# Patient Record
Sex: FEMALE | Race: WHITE | NOT HISPANIC OR LATINO | Employment: UNEMPLOYED | URBAN - METROPOLITAN AREA
[De-identification: names, ages, dates, MRNs, and addresses within clinical notes are randomized per-mention and may not be internally consistent; named-entity substitution may affect disease eponyms.]

---

## 2020-02-12 PROBLEM — I47.19 AV NODAL RE-ENTRY TACHYCARDIA: Status: ACTIVE | Noted: 2020-02-12

## 2020-02-12 PROBLEM — G90.A POTS (POSTURAL ORTHOSTATIC TACHYCARDIA SYNDROME): Status: ACTIVE | Noted: 2020-02-12

## 2020-02-12 PROBLEM — E89.40 POSTSURGICAL MENOPAUSE: Status: ACTIVE | Noted: 2020-02-12

## 2020-02-12 PROBLEM — I47.1 AV NODAL RE-ENTRY TACHYCARDIA: Status: ACTIVE | Noted: 2020-02-12

## 2021-03-23 ENCOUNTER — BULK ORDERING (OUTPATIENT)
Dept: CASE MANAGEMENT | Facility: OTHER | Age: 61
End: 2021-03-23

## 2021-03-23 DIAGNOSIS — Z23 IMMUNIZATION DUE: ICD-10-CM

## 2021-03-25 ENCOUNTER — TRANSCRIBE ORDERS (OUTPATIENT)
Dept: ADMINISTRATIVE | Facility: HOSPITAL | Age: 61
End: 2021-03-25

## 2021-03-25 ENCOUNTER — TRANSCRIBE ORDERS (OUTPATIENT)
Dept: SLEEP MEDICINE | Facility: HOSPITAL | Age: 61
End: 2021-03-25

## 2021-03-25 DIAGNOSIS — M54.41 RIGHT-SIDED LOW BACK PAIN WITH RIGHT-SIDED SCIATICA, UNSPECIFIED CHRONICITY: Primary | ICD-10-CM

## 2021-04-08 ENCOUNTER — HOSPITAL ENCOUNTER (OUTPATIENT)
Dept: MRI IMAGING | Facility: HOSPITAL | Age: 61
Discharge: HOME | End: 2021-04-08
Admitting: ORTHOPAEDIC SURGERY

## 2021-04-08 DIAGNOSIS — M54.41 RIGHT-SIDED LOW BACK PAIN WITH RIGHT-SIDED SCIATICA, UNSPECIFIED CHRONICITY: ICD-10-CM

## 2021-04-08 PROCEDURE — 72148 MRI LUMBAR SPINE W/O DYE: CPT

## 2021-05-14 ENCOUNTER — TRANSCRIBE ORDERS (OUTPATIENT)
Dept: ADMINISTRATIVE | Facility: HOSPITAL | Age: 61
End: 2021-05-14

## 2021-05-14 DIAGNOSIS — M25.551 RIGHT HIP PAIN: Primary | ICD-10-CM

## 2021-06-17 ENCOUNTER — APPOINTMENT (OUTPATIENT)
Dept: MRI IMAGING | Facility: HOSPITAL | Age: 61
End: 2021-06-17

## 2021-06-21 ENCOUNTER — HOSPITAL ENCOUNTER (OUTPATIENT)
Dept: GENERAL RADIOLOGY | Facility: HOSPITAL | Age: 61
Discharge: HOME | End: 2021-06-21

## 2021-06-21 ENCOUNTER — HOSPITAL ENCOUNTER (OUTPATIENT)
Dept: MRI IMAGING | Facility: HOSPITAL | Age: 61
Discharge: HOME | End: 2021-06-21

## 2021-06-21 DIAGNOSIS — M25.551 RIGHT HIP PAIN: ICD-10-CM

## 2021-06-21 PROCEDURE — 77002 NEEDLE LOCALIZATION BY XRAY: CPT

## 2021-06-21 PROCEDURE — 25010000002 IOPAMIDOL 61 % SOLUTION: Performed by: ORTHOPAEDIC SURGERY

## 2021-06-21 PROCEDURE — A9577 INJ MULTIHANCE: HCPCS | Performed by: ORTHOPAEDIC SURGERY

## 2021-06-21 PROCEDURE — 73722 MRI JOINT OF LWR EXTR W/DYE: CPT

## 2021-06-21 PROCEDURE — 25010000003 LIDOCAINE 1 % SOLUTION: Performed by: ORTHOPAEDIC SURGERY

## 2021-06-21 PROCEDURE — 0 GADOBENATE DIMEGLUMINE 529 MG/ML SOLUTION: Performed by: ORTHOPAEDIC SURGERY

## 2021-06-21 RX ORDER — LIDOCAINE HYDROCHLORIDE 10 MG/ML
10 INJECTION, SOLUTION INFILTRATION; PERINEURAL ONCE
Status: COMPLETED | OUTPATIENT
Start: 2021-06-21 | End: 2021-06-21

## 2021-06-21 RX ADMIN — GADOBENATE DIMEGLUMINE 1 ML: 529 INJECTION, SOLUTION INTRAVENOUS at 14:23

## 2021-06-21 RX ADMIN — LIDOCAINE HYDROCHLORIDE 10 ML: 10 INJECTION, SOLUTION INFILTRATION; PERINEURAL at 14:09

## 2021-06-21 RX ADMIN — IOPAMIDOL 10 ML: 612 INJECTION, SOLUTION INTRAVENOUS at 14:09

## 2021-06-23 ENCOUNTER — TRANSCRIBE ORDERS (OUTPATIENT)
Dept: ADMINISTRATIVE | Facility: HOSPITAL | Age: 61
End: 2021-06-23

## 2021-06-23 DIAGNOSIS — M81.0 OSTEOPOROSIS, POSTMENOPAUSAL: Primary | ICD-10-CM

## 2021-06-29 ENCOUNTER — ANESTHESIA EVENT (OUTPATIENT)
Dept: PERIOP | Facility: HOSPITAL | Age: 61
End: 2021-06-29

## 2021-06-29 ENCOUNTER — APPOINTMENT (OUTPATIENT)
Dept: BONE DENSITY | Facility: HOSPITAL | Age: 61
End: 2021-06-29

## 2021-06-29 ENCOUNTER — PRE-ADMISSION TESTING (OUTPATIENT)
Dept: PREADMISSION TESTING | Facility: HOSPITAL | Age: 61
End: 2021-06-29

## 2021-06-29 DIAGNOSIS — M81.0 OSTEOPOROSIS, POSTMENOPAUSAL: ICD-10-CM

## 2021-06-29 LAB
ANION GAP SERPL CALCULATED.3IONS-SCNC: 12.8 MMOL/L (ref 5–15)
BACTERIA UR QL AUTO: ABNORMAL /HPF
BILIRUB UR QL STRIP: NEGATIVE
BUN SERPL-MCNC: 12 MG/DL (ref 6–20)
BUN/CREAT SERPL: 14.5 (ref 7–25)
CALCIUM SPEC-SCNC: 10.2 MG/DL (ref 8.6–10.5)
CHLORIDE SERPL-SCNC: 107 MMOL/L (ref 98–107)
CLARITY UR: CLEAR
CO2 SERPL-SCNC: 19.2 MMOL/L (ref 22–29)
COLOR UR: YELLOW
CREAT SERPL-MCNC: 0.83 MG/DL (ref 0.57–1)
DEPRECATED RDW RBC AUTO: 48.5 FL (ref 37–54)
ERYTHROCYTE [DISTWIDTH] IN BLOOD BY AUTOMATED COUNT: 13.5 % (ref 12.3–15.4)
GFR SERPL CREATININE-BSD FRML MDRD: 70 ML/MIN/1.73
GLUCOSE SERPL-MCNC: 80 MG/DL (ref 65–99)
GLUCOSE UR STRIP-MCNC: NEGATIVE MG/DL
HCT VFR BLD AUTO: 46.1 % (ref 34–46.6)
HGB BLD-MCNC: 15.1 G/DL (ref 12–15.9)
HGB UR QL STRIP.AUTO: NEGATIVE
HYALINE CASTS UR QL AUTO: ABNORMAL /LPF
KETONES UR QL STRIP: NEGATIVE
LEUKOCYTE ESTERASE UR QL STRIP.AUTO: ABNORMAL
MCH RBC QN AUTO: 31.8 PG (ref 26.6–33)
MCHC RBC AUTO-ENTMCNC: 32.8 G/DL (ref 31.5–35.7)
MCV RBC AUTO: 97.1 FL (ref 79–97)
NITRITE UR QL STRIP: NEGATIVE
PH UR STRIP.AUTO: 6 [PH] (ref 5–8)
PLATELET # BLD AUTO: 265 10*3/MM3 (ref 140–450)
PMV BLD AUTO: 11.2 FL (ref 6–12)
POTASSIUM SERPL-SCNC: 4.2 MMOL/L (ref 3.5–5.2)
PROT UR QL STRIP: NEGATIVE
RBC # BLD AUTO: 4.75 10*6/MM3 (ref 3.77–5.28)
RBC # UR: ABNORMAL /HPF
REF LAB TEST METHOD: ABNORMAL
SODIUM SERPL-SCNC: 139 MMOL/L (ref 136–145)
SP GR UR STRIP: 1.02 (ref 1–1.03)
SQUAMOUS #/AREA URNS HPF: ABNORMAL /HPF
UROBILINOGEN UR QL STRIP: ABNORMAL
WBC # BLD AUTO: 9.91 10*3/MM3 (ref 3.4–10.8)
WBC UR QL AUTO: ABNORMAL /HPF

## 2021-06-29 PROCEDURE — 80048 BASIC METABOLIC PNL TOTAL CA: CPT

## 2021-06-29 PROCEDURE — 93005 ELECTROCARDIOGRAM TRACING: CPT

## 2021-06-29 PROCEDURE — 77080 DXA BONE DENSITY AXIAL: CPT

## 2021-06-29 PROCEDURE — C9803 HOPD COVID-19 SPEC COLLECT: HCPCS

## 2021-06-29 PROCEDURE — 87186 SC STD MICRODIL/AGAR DIL: CPT

## 2021-06-29 PROCEDURE — 85027 COMPLETE CBC AUTOMATED: CPT

## 2021-06-29 PROCEDURE — U0004 COV-19 TEST NON-CDC HGH THRU: HCPCS | Performed by: ORTHOPAEDIC SURGERY

## 2021-06-29 PROCEDURE — 87077 CULTURE AEROBIC IDENTIFY: CPT

## 2021-06-29 PROCEDURE — 87086 URINE CULTURE/COLONY COUNT: CPT

## 2021-06-29 PROCEDURE — 36415 COLL VENOUS BLD VENIPUNCTURE: CPT

## 2021-06-29 PROCEDURE — 81001 URINALYSIS AUTO W/SCOPE: CPT

## 2021-06-29 RX ORDER — HYDROCODONE BITARTRATE AND ACETAMINOPHEN 5; 325 MG/1; MG/1
1 TABLET ORAL EVERY 6 HOURS PRN
COMMUNITY
End: 2021-07-01 | Stop reason: HOSPADM

## 2021-06-29 RX ORDER — ERGOCALCIFEROL 1.25 MG/1
5000 CAPSULE ORAL DAILY
COMMUNITY

## 2021-06-30 LAB
QT INTERVAL: 388 MS
QTC INTERVAL: 442 MS
SARS-COV-2 RNA NOSE QL NAA+PROBE: NOT DETECTED

## 2021-07-01 ENCOUNTER — APPOINTMENT (OUTPATIENT)
Dept: GENERAL RADIOLOGY | Facility: HOSPITAL | Age: 61
End: 2021-07-01

## 2021-07-01 ENCOUNTER — ANESTHESIA (OUTPATIENT)
Dept: PERIOP | Facility: HOSPITAL | Age: 61
End: 2021-07-01

## 2021-07-01 ENCOUNTER — HOSPITAL ENCOUNTER (OUTPATIENT)
Facility: HOSPITAL | Age: 61
Setting detail: HOSPITAL OUTPATIENT SURGERY
Discharge: HOME | End: 2021-07-01
Attending: ORTHOPAEDIC SURGERY | Admitting: ORTHOPAEDIC SURGERY

## 2021-07-01 VITALS
WEIGHT: 216 LBS | OXYGEN SATURATION: 92 % | HEIGHT: 67 IN | SYSTOLIC BLOOD PRESSURE: 126 MMHG | HEART RATE: 82 BPM | BODY MASS INDEX: 33.9 KG/M2 | TEMPERATURE: 97.8 F | RESPIRATION RATE: 14 BRPM | DIASTOLIC BLOOD PRESSURE: 63 MMHG

## 2021-07-01 DIAGNOSIS — M84.353A FEMORAL NECK STRESS FRACTURE, INITIAL ENCOUNTER: Primary | ICD-10-CM

## 2021-07-01 LAB — BACTERIA SPEC AEROBE CULT: ABNORMAL

## 2021-07-01 PROCEDURE — 25010000002 DEXAMETHASONE PER 1 MG: Performed by: NURSE ANESTHETIST, CERTIFIED REGISTERED

## 2021-07-01 PROCEDURE — 25010000002 MIDAZOLAM PER 1MG: Performed by: NURSE ANESTHETIST, CERTIFIED REGISTERED

## 2021-07-01 PROCEDURE — 25010000002 LORAZEPAM PER 2 MG: Performed by: NURSE ANESTHETIST, CERTIFIED REGISTERED

## 2021-07-01 PROCEDURE — C1713 ANCHOR/SCREW BN/BN,TIS/BN: HCPCS | Performed by: ORTHOPAEDIC SURGERY

## 2021-07-01 PROCEDURE — 25010000002 ONDANSETRON PER 1 MG: Performed by: NURSE ANESTHETIST, CERTIFIED REGISTERED

## 2021-07-01 PROCEDURE — C1769 GUIDE WIRE: HCPCS | Performed by: ORTHOPAEDIC SURGERY

## 2021-07-01 PROCEDURE — 76000 FLUOROSCOPY <1 HR PHYS/QHP: CPT

## 2021-07-01 PROCEDURE — 25010000002 HYDROMORPHONE 1 MG/ML SOLUTION: Performed by: NURSE ANESTHETIST, CERTIFIED REGISTERED

## 2021-07-01 PROCEDURE — 94799 UNLISTED PULMONARY SVC/PX: CPT

## 2021-07-01 PROCEDURE — 25010000003 CEFAZOLIN SODIUM-DEXTROSE 2-3 GM-%(50ML) RECONSTITUTED SOLUTION: Performed by: ORTHOPAEDIC SURGERY

## 2021-07-01 PROCEDURE — 25010000002 HYDROMORPHONE PER 4 MG: Performed by: NURSE ANESTHETIST, CERTIFIED REGISTERED

## 2021-07-01 PROCEDURE — 25010000002 PROPOFOL 200 MG/20ML EMULSION: Performed by: NURSE ANESTHETIST, CERTIFIED REGISTERED

## 2021-07-01 PROCEDURE — 25010000002 FENTANYL CITRATE (PF) 100 MCG/2ML SOLUTION: Performed by: NURSE ANESTHETIST, CERTIFIED REGISTERED

## 2021-07-01 DEVICE — IMPLANTABLE DEVICE: Type: IMPLANTABLE DEVICE | Site: HIP | Status: FUNCTIONAL

## 2021-07-01 RX ORDER — DEXAMETHASONE SODIUM PHOSPHATE 4 MG/ML
INJECTION, SOLUTION INTRA-ARTICULAR; INTRALESIONAL; INTRAMUSCULAR; INTRAVENOUS; SOFT TISSUE AS NEEDED
Status: DISCONTINUED | OUTPATIENT
Start: 2021-07-01 | End: 2021-07-01 | Stop reason: SURG

## 2021-07-01 RX ORDER — ONDANSETRON 2 MG/ML
4 INJECTION INTRAMUSCULAR; INTRAVENOUS ONCE
Status: DISCONTINUED | OUTPATIENT
Start: 2021-07-01 | End: 2021-07-01 | Stop reason: HOSPADM

## 2021-07-01 RX ORDER — SODIUM CHLORIDE 0.9 % (FLUSH) 0.9 %
10 SYRINGE (ML) INJECTION AS NEEDED
Status: DISCONTINUED | OUTPATIENT
Start: 2021-07-01 | End: 2021-07-01 | Stop reason: HOSPADM

## 2021-07-01 RX ORDER — OXYCODONE HYDROCHLORIDE AND ACETAMINOPHEN 5; 325 MG/1; MG/1
1 TABLET ORAL EVERY 4 HOURS PRN
Qty: 60 TABLET | Refills: 0 | Status: SHIPPED | OUTPATIENT
Start: 2021-07-01

## 2021-07-01 RX ORDER — HYDROCODONE BITARTRATE AND ACETAMINOPHEN 5; 325 MG/1; MG/1
1 TABLET ORAL EVERY 6 HOURS PRN
Status: DISCONTINUED | OUTPATIENT
Start: 2021-07-01 | End: 2021-07-01 | Stop reason: HOSPADM

## 2021-07-01 RX ORDER — MIDAZOLAM HYDROCHLORIDE 2 MG/2ML
INJECTION, SOLUTION INTRAMUSCULAR; INTRAVENOUS AS NEEDED
Status: DISCONTINUED | OUTPATIENT
Start: 2021-07-01 | End: 2021-07-01 | Stop reason: SURG

## 2021-07-01 RX ORDER — SODIUM CHLORIDE, SODIUM LACTATE, POTASSIUM CHLORIDE, CALCIUM CHLORIDE 600; 310; 30; 20 MG/100ML; MG/100ML; MG/100ML; MG/100ML
1000 INJECTION, SOLUTION INTRAVENOUS CONTINUOUS
Status: DISCONTINUED | OUTPATIENT
Start: 2021-07-01 | End: 2021-07-01 | Stop reason: HOSPADM

## 2021-07-01 RX ORDER — HYDROMORPHONE HCL 110MG/55ML
PATIENT CONTROLLED ANALGESIA SYRINGE INTRAVENOUS AS NEEDED
Status: DISCONTINUED | OUTPATIENT
Start: 2021-07-01 | End: 2021-07-01 | Stop reason: SURG

## 2021-07-01 RX ORDER — CEFAZOLIN SODIUM 2 G/50ML
2 SOLUTION INTRAVENOUS ONCE
Status: COMPLETED | OUTPATIENT
Start: 2021-07-01 | End: 2021-07-01

## 2021-07-01 RX ORDER — BUPIVACAINE HYDROCHLORIDE 5 MG/ML
INJECTION, SOLUTION EPIDURAL; INTRACAUDAL AS NEEDED
Status: DISCONTINUED | OUTPATIENT
Start: 2021-07-01 | End: 2021-07-01 | Stop reason: HOSPADM

## 2021-07-01 RX ORDER — IPRATROPIUM BROMIDE AND ALBUTEROL SULFATE 2.5; .5 MG/3ML; MG/3ML
3 SOLUTION RESPIRATORY (INHALATION) ONCE
Status: DISCONTINUED | OUTPATIENT
Start: 2021-07-01 | End: 2021-07-01 | Stop reason: HOSPADM

## 2021-07-01 RX ORDER — PROPOFOL 10 MG/ML
INJECTION, EMULSION INTRAVENOUS AS NEEDED
Status: DISCONTINUED | OUTPATIENT
Start: 2021-07-01 | End: 2021-07-01 | Stop reason: SURG

## 2021-07-01 RX ORDER — ONDANSETRON 2 MG/ML
INJECTION INTRAMUSCULAR; INTRAVENOUS AS NEEDED
Status: DISCONTINUED | OUTPATIENT
Start: 2021-07-01 | End: 2021-07-01 | Stop reason: SURG

## 2021-07-01 RX ORDER — FENTANYL CITRATE 50 UG/ML
INJECTION, SOLUTION INTRAMUSCULAR; INTRAVENOUS AS NEEDED
Status: DISCONTINUED | OUTPATIENT
Start: 2021-07-01 | End: 2021-07-01 | Stop reason: SURG

## 2021-07-01 RX ORDER — LIDOCAINE HYDROCHLORIDE 20 MG/ML
INJECTION, SOLUTION INTRAVENOUS AS NEEDED
Status: DISCONTINUED | OUTPATIENT
Start: 2021-07-01 | End: 2021-07-01 | Stop reason: SURG

## 2021-07-01 RX ORDER — LORAZEPAM 2 MG/ML
1 INJECTION INTRAMUSCULAR ONCE
Status: COMPLETED | OUTPATIENT
Start: 2021-07-01 | End: 2021-07-01

## 2021-07-01 RX ORDER — SODIUM CHLORIDE, SODIUM LACTATE, POTASSIUM CHLORIDE, CALCIUM CHLORIDE 600; 310; 30; 20 MG/100ML; MG/100ML; MG/100ML; MG/100ML
INJECTION, SOLUTION INTRAVENOUS CONTINUOUS PRN
Status: DISCONTINUED | OUTPATIENT
Start: 2021-07-01 | End: 2021-07-01 | Stop reason: SURG

## 2021-07-01 RX ORDER — ACETAMINOPHEN 500 MG
500 TABLET ORAL ONCE
Status: COMPLETED | OUTPATIENT
Start: 2021-07-01 | End: 2021-07-01

## 2021-07-01 RX ADMIN — LORAZEPAM 1 MG: 2 INJECTION INTRAMUSCULAR; INTRAVENOUS at 15:34

## 2021-07-01 RX ADMIN — HYDROMORPHONE HYDROCHLORIDE 0.5 MG: 1 INJECTION, SOLUTION INTRAMUSCULAR; INTRAVENOUS; SUBCUTANEOUS at 15:32

## 2021-07-01 RX ADMIN — LIDOCAINE HYDROCHLORIDE 40 MG: 20 INJECTION, SOLUTION INTRAVENOUS at 14:21

## 2021-07-01 RX ADMIN — HYDROMORPHONE HYDROCHLORIDE 0.5 MG: 2 INJECTION, SOLUTION INTRAMUSCULAR; INTRAVENOUS; SUBCUTANEOUS at 14:54

## 2021-07-01 RX ADMIN — HYDROCODONE BITARTRATE AND ACETAMINOPHEN 1 TABLET: 5; 325 TABLET ORAL at 13:50

## 2021-07-01 RX ADMIN — SODIUM CHLORIDE, POTASSIUM CHLORIDE, SODIUM LACTATE AND CALCIUM CHLORIDE: 600; 310; 30; 20 INJECTION, SOLUTION INTRAVENOUS at 14:06

## 2021-07-01 RX ADMIN — SODIUM CHLORIDE, POTASSIUM CHLORIDE, SODIUM LACTATE AND CALCIUM CHLORIDE 1000 ML: 600; 310; 30; 20 INJECTION, SOLUTION INTRAVENOUS at 13:13

## 2021-07-01 RX ADMIN — SODIUM CHLORIDE, POTASSIUM CHLORIDE, SODIUM LACTATE AND CALCIUM CHLORIDE: 600; 310; 30; 20 INJECTION, SOLUTION INTRAVENOUS at 14:20

## 2021-07-01 RX ADMIN — CEFAZOLIN SODIUM 2 G: 2 SOLUTION INTRAVENOUS at 14:15

## 2021-07-01 RX ADMIN — ONDANSETRON 4 MG: 2 INJECTION INTRAMUSCULAR; INTRAVENOUS at 14:43

## 2021-07-01 RX ADMIN — ACETAMINOPHEN 500 MG: 500 TABLET ORAL at 13:50

## 2021-07-01 RX ADMIN — FENTANYL CITRATE 50 MCG: 50 INJECTION INTRAMUSCULAR; INTRAVENOUS at 14:20

## 2021-07-01 RX ADMIN — PROPOFOL 150 MG: 10 INJECTION, EMULSION INTRAVENOUS at 14:22

## 2021-07-01 RX ADMIN — HYDROMORPHONE HYDROCHLORIDE 0.5 MG: 1 INJECTION, SOLUTION INTRAMUSCULAR; INTRAVENOUS; SUBCUTANEOUS at 15:56

## 2021-07-01 RX ADMIN — DEXAMETHASONE SODIUM PHOSPHATE 8 MG: 4 INJECTION, SOLUTION INTRAMUSCULAR; INTRAVENOUS at 14:43

## 2021-07-01 RX ADMIN — MIDAZOLAM HYDROCHLORIDE 2 MG: 1 INJECTION, SOLUTION INTRAMUSCULAR; INTRAVENOUS at 14:16

## 2021-07-01 RX ADMIN — FENTANYL CITRATE 50 MCG: 50 INJECTION INTRAMUSCULAR; INTRAVENOUS at 14:17

## 2021-07-01 RX ADMIN — HYDROMORPHONE HYDROCHLORIDE 0.5 MG: 2 INJECTION, SOLUTION INTRAMUSCULAR; INTRAVENOUS; SUBCUTANEOUS at 14:51

## 2021-07-01 NOTE — BRIEF OP NOTE
HIP CANNULATED SCREW PLACEMENT  Progress Note    Zulay Vega  7/1/2021    Pre-op Diagnosis:   Pain in right hip [M25.551]       Post-Op Diagnosis Codes:     * Pain in right hip [M25.551]    Procedure/CPT® Codes:        Procedure(s):  CANNULATED SCREWS FIXATION OF RIGHT HIP FRACTURE    Surgeon(s):  Ady Gomez MD    Anesthesia: General    Staff:   Circulator: Marcie Esposito RN; Lisset Simon RN  Radiology Technologist: Laura Renee  Scrub Person: Chuy Sommers CSA; Karen Pierce         Estimated Blood Loss: minimal    Urine Voided: * No values recorded between 7/1/2021  2:16 PM and 7/1/2021  3:20 PM *    Specimens:                None          Drains: * No LDAs found *    Findings: see full dictation    Complications: none          Ady Gomez MD     Date: 7/1/2021  Time: 15:21 EDT

## 2021-07-01 NOTE — ANESTHESIA PREPROCEDURE EVALUATION
Anesthesia Evaluation     Patient summary reviewed and Nursing notes reviewed   no history of anesthetic complications:  NPO Solid Status: > 8 hours  NPO Liquid Status: > 8 hours           Airway   Mallampati: II  TM distance: >3 FB  Neck ROM: full  Possible difficult intubation and Large neck circumference  Dental - normal exam   (+) poor dentition    Pulmonary    (+) a smoker Current, COPD, shortness of breath, sleep apnea, decreased breath sounds,   Cardiovascular - normal exam    ECG reviewed    (+) past MI ( unknown, mi per ekg) , dysrhythmias Tachycardia, angina with exertion, MALLORY,   CAD:  inc risk.      Neuro/Psych- negative ROS  GI/Hepatic/Renal/Endo    (+) obesity,       Musculoskeletal     (+) arthralgias, back pain, chronic pain, myalgias,   Abdominal   (+) obese,     Bowel sounds: normal.   Substance History - negative use  Drug use:  ? use.     OB/GYN negative ob/gyn ROS         Other   arthritis,      ROS/Med Hx Other: Postural orthostati ctachycardia hypotension  Labs reviewed  ekg sr septal infarct                Anesthesia Plan    ASA 4     general   (Risks and benefits discussed including risk of aspiration, recall and dental damage. All patient questions answered.    Will continue with plan of care.Tylenol 500mg  hydrocodone 5mg)  intravenous induction     Anesthetic plan, all risks, benefits, and alternatives have been provided, discussed and informed consent has been obtained with: patient.    Plan discussed with attending.

## 2021-07-01 NOTE — DISCHARGE INSTRUCTIONS
USE WALKER TO AMBULATE, TOE TOUCH WEIGHT BEARING ON RIGHT SIDE  YOU MAY REMOVE DRESSING ON POD #2.  CHANGE DRESSING DAILY UNTIL NO DRAINAGE FOR TWO CONSECUTIVE DAYS.  YOU MAY SHOWER BEGINNING ON POD #3.  KEEP WOUND CLEAN AND DRY.  NO SOAKING/SCRUBBING/SWIMMING  NO OINTMENTS OR LOTIONS    Rest today  No pushing,pulling,tugging,heavy lifting, or strenuous activity   No major decision making,driving,or drinking alcoholic beverages for 24 hours due to the sedation you received  Always use good hand hygiene/washing technique  No driving on pain medication.    To assist you in voiding:  Drink plenty of fluids  Listen to running water while attempting to void.    If you are unable to urinate and you have an uncomfortable urge to void or it has been   6 hours since you were discharged, return to the Emergency Room

## 2021-07-01 NOTE — ANESTHESIA PROCEDURE NOTES
Airway  Urgency: elective    Date/Time: 7/1/2021 2:16 PM    General Information and Staff    Patient location during procedure: OR  CRNA: Scott Carrasco CRNA    Indications and Patient Condition  Indications: management.    Preoxygenated: yes  Mask difficulty assessment: 1 - vent by mask    Final Airway Details  Final airway type: supraglottic airway      Successful airway: classic  Size 4    Number of attempts at approach: 1  Assessment: lips, teeth, and gum same as pre-op and atraumatic intubation    Additional Comments  Lma placed by standard fashion, cuff up with mov, bbs and expansion =, + etco, tolerated without adverse rx. Syringe to  balloon for cuff pressure equalization, gauge in the green zone

## 2021-07-01 NOTE — ANESTHESIA POSTPROCEDURE EVALUATION
Patient: Zulay Vega    Procedure Summary     Date: 07/01/21 Room / Location: [facility] OR 1 / [facility] OR    Anesthesia Start: 1415 Anesthesia Stop:     Procedure: CANNULATED SCREWS FIXATION OF RIGHT HIP FRACTURE (Right Hip) Diagnosis:       Pain in right hip      (Pain in right hip [M25.551])    Surgeons: Ady Gomez MD Provider: Juan Diaz CRNA    Anesthesia Type: general ASA Status: 4          Anesthesia Type: general    Vitals  No vitals data found for the desired time range.          Post Anesthesia Care and Evaluation    Patient location during evaluation: PACU  Patient participation: complete - patient participated  Level of consciousness: awake  Pain score: 0  Pain management: adequate  Airway patency: patent  Anesthetic complications: No anesthetic complications  PONV Status: none  Cardiovascular status: acceptable  Respiratory status: acceptable and face mask  Hydration status: acceptable    Comments: vsss resp spont, reflexes intact, responsive, report given to pacu nurse

## 2021-07-01 NOTE — OP NOTE
Preoperative diagnosis: R femoral neck stress fracture  Postoperative diagnosis: Same  Procedure: Closed reduction and fixation with cannulated screws of the R hip  Surgeon: Jason Gaines.: None  Anesthesia: LMA  Estimated blood loss: minimal  Implants: 7.3mm cannulated screws x3    Description of procedure:    The patient was identified in the preoperative holding area at [facility] and transported to operating room the care of myself and anesthesia staff.  The patient was placed supine on her bed where anesthesia was given.  The patient was then placed on a fracture table with slight traction placed on both lower extremities.  The operative side was confirmed with a timeout as well as the appropriate patient.  The operative antibodies were delivered.  We confirmed the fracture by fluoroscopy.  We then turned our attention towards incision.    A percutaneous incision was made for all 3 cannulated screws.  A threaded guidepin was placed through the percutaneous incision, and advanced across the femoral neck and into the femoral head.  This process was repeated for all 3 cannulated screws.  Chose an inverted triangle configuration of our cannulated screws.  With the threaded guide pins in the appropriate position in both AP and lateral views we then measured the length of the guide pins and then used the cannulated drill guide to perforate the lateral cortex.  We then placed the cannulated screws over the guide pins into the appropriate position confirmed the position on fluoroscopy and then removed the associated guide pins from the inner portion of the cannulated screws.    We then irrigated the wounds and closed them with staples.  The patient tolerated procedure well.  All counts correct ×2.

## 2022-03-29 ENCOUNTER — HOSPITAL ENCOUNTER (OUTPATIENT)
Dept: GENERAL RADIOLOGY | Facility: HOSPITAL | Age: 62
Discharge: HOME | End: 2022-03-29
Admitting: ANESTHESIOLOGY

## 2022-03-29 ENCOUNTER — TRANSCRIBE ORDERS (OUTPATIENT)
Dept: GENERAL RADIOLOGY | Facility: HOSPITAL | Age: 62
End: 2022-03-29

## 2022-03-29 DIAGNOSIS — M25.562 LEFT KNEE PAIN, UNSPECIFIED CHRONICITY: ICD-10-CM

## 2022-03-29 DIAGNOSIS — M25.561 RIGHT KNEE PAIN, UNSPECIFIED CHRONICITY: ICD-10-CM

## 2022-03-29 DIAGNOSIS — M25.561 RIGHT KNEE PAIN, UNSPECIFIED CHRONICITY: Primary | ICD-10-CM

## 2022-03-29 PROCEDURE — 73562 X-RAY EXAM OF KNEE 3: CPT

## 2023-05-04 ENCOUNTER — HOSPITAL ENCOUNTER (OUTPATIENT)
Dept: ULTRASOUND IMAGING | Facility: HOSPITAL | Age: 63
Discharge: HOME | End: 2023-05-04
Admitting: NURSE PRACTITIONER
Payer: MEDICAID

## 2023-05-04 ENCOUNTER — TRANSCRIBE ORDERS (OUTPATIENT)
Dept: ULTRASOUND IMAGING | Facility: HOSPITAL | Age: 63
End: 2023-05-04
Payer: MEDICAID

## 2023-05-04 DIAGNOSIS — R10.11 ABDOMINAL PAIN, RIGHT UPPER QUADRANT: Primary | ICD-10-CM

## 2023-05-04 DIAGNOSIS — R10.11 ABDOMINAL PAIN, RIGHT UPPER QUADRANT: ICD-10-CM

## 2023-05-04 PROCEDURE — 76705 ECHO EXAM OF ABDOMEN: CPT

## 2023-05-18 ENCOUNTER — OFFICE VISIT (OUTPATIENT)
Dept: GASTROENTEROLOGY | Facility: CLINIC | Age: 63
End: 2023-05-18
Payer: MEDICAID

## 2023-05-18 VITALS
BODY MASS INDEX: 39.24 KG/M2 | HEIGHT: 67 IN | DIASTOLIC BLOOD PRESSURE: 66 MMHG | TEMPERATURE: 97.8 F | WEIGHT: 250 LBS | SYSTOLIC BLOOD PRESSURE: 121 MMHG | HEART RATE: 79 BPM

## 2023-05-18 DIAGNOSIS — Z12.11 ENCOUNTER FOR SCREENING FOR MALIGNANT NEOPLASM OF COLON: ICD-10-CM

## 2023-05-18 DIAGNOSIS — R10.30 LOWER ABDOMINAL PAIN: Primary | ICD-10-CM

## 2023-05-18 DIAGNOSIS — E66.09 CLASS 2 OBESITY DUE TO EXCESS CALORIES WITHOUT SERIOUS COMORBIDITY WITH BODY MASS INDEX (BMI) OF 39.0 TO 39.9 IN ADULT: ICD-10-CM

## 2023-05-18 DIAGNOSIS — K58.0 IRRITABLE BOWEL SYNDROME WITH DIARRHEA: ICD-10-CM

## 2023-05-18 DIAGNOSIS — R19.4 CHANGE IN BOWEL HABITS: ICD-10-CM

## 2023-05-18 DIAGNOSIS — K21.9 GASTROESOPHAGEAL REFLUX DISEASE WITHOUT ESOPHAGITIS: ICD-10-CM

## 2023-05-18 DIAGNOSIS — K76.0 NON-ALCOHOLIC FATTY LIVER DISEASE: ICD-10-CM

## 2023-05-18 DIAGNOSIS — R10.11 RIGHT UPPER QUADRANT ABDOMINAL PAIN: ICD-10-CM

## 2023-05-18 RX ORDER — METAXALONE 800 MG/1
800 TABLET ORAL 3 TIMES DAILY
COMMUNITY

## 2023-05-18 RX ORDER — PREGABALIN 150 MG/1
150 CAPSULE ORAL 3 TIMES DAILY
COMMUNITY

## 2023-05-18 RX ORDER — DICYCLOMINE HCL 20 MG
20 TABLET ORAL 3 TIMES DAILY PRN
Qty: 60 TABLET | Refills: 2 | Status: SHIPPED | OUTPATIENT
Start: 2023-05-18

## 2023-05-18 RX ORDER — SODIUM CHLORIDE 9 MG/ML
70 INJECTION, SOLUTION INTRAVENOUS CONTINUOUS PRN
OUTPATIENT
Start: 2023-05-18

## 2023-05-18 RX ORDER — CYANOCOBALAMIN 1000 UG/ML
1000 INJECTION, SOLUTION INTRAMUSCULAR; SUBCUTANEOUS
COMMUNITY

## 2023-05-18 RX ORDER — BISACODYL 5 MG/1
20 TABLET, DELAYED RELEASE ORAL ONCE
Qty: 4 TABLET | Refills: 0 | Status: SHIPPED | OUTPATIENT
Start: 2023-05-18 | End: 2023-05-18

## 2023-05-18 RX ORDER — ESTRADIOL 0.07 MG/D
1 FILM, EXTENDED RELEASE TRANSDERMAL 2 TIMES WEEKLY
COMMUNITY
Start: 2023-03-15

## 2023-05-18 RX ORDER — ATORVASTATIN CALCIUM 80 MG/1
1 TABLET, FILM COATED ORAL DAILY
COMMUNITY
Start: 2023-05-13

## 2023-05-18 RX ORDER — PANTOPRAZOLE SODIUM 40 MG/1
40 TABLET, DELAYED RELEASE ORAL DAILY
Qty: 30 TABLET | Refills: 11 | COMMUNITY
Start: 2022-12-15 | End: 2023-12-15

## 2023-05-18 NOTE — PROGRESS NOTES
New Patient Consult      Date: 2023   Patient Name: Zulay Vega  MRN: [id number]  : 1960     Referring Physician: Jackelyn Ramey APRN    Chief Complaint   Patient presents with   • + H. pylori test       History of Present Illness: Zulay Vega is a 63 y.o. female who is here today to establish care with Gastroenterology for evaluation for ongoing abdominal pain change in bowel habit.    Patient states that she has been having issues with the significant abdominal bloating, abdominal distention, right-sided lower abdominal pain over a year now which is not improving.    Her pain is mostly on the right side abdomen and also sometimes suprapubic region sometimes pain radiates to her back.  Pain intermittent present most days.  She feels bloated mostly on the right side abdomen.  She has been having bowel movement 2 to 3/day which very different in shape and sometimes floats.  Denies any blood in the stool.  No constipation as such.  Occasional nausea associated but no vomiting.  She is a chronic smoker and her dietary habit is also not healthy and drinks multiple cans of carbonated beverages at least 4 to 5 20 once beverages /day.  As per patient she also gained over 100 pounds since last 1 to 2 years.    She also has a history of gastroesophageal reflux disease and is well controlled with PPI daily that has been started by PCP in 2023.  She states that she had a blood work done which was positive for H. pylori for which he was given a triple therapy.  To note that H. pylori breath test was negative before the antibiotic therapy.    No prior EGD or colonoscopy.  She is nonalcoholic.  No history of anemia.  No family history of colon cancer or GI malignancy.  Her lab work done in 2023 revealed a normal CBC with a hemoglobin of 14 and normal CMP.  Celiac serology was negative.  Subjective      Past Medical History:   Past Medical History:   Diagnosis Date   • Angina  pectoris 06/29/2021    at rest or exersion- was told by Dr. Ramey to learn to live with it/ none for 6 mon. to 1 yr   • Arthritis    • AV yesenia tachycardia     with svt   • Blurred vision    • Dizziness    • Heart disease    • Hypotension     orthostatic   • Migraines    • Mitral valve disease 06/29/2021   • Osteoporosis 06/29/2021   • Pain in joint involving multiple sites    • Poor historian 06/29/2021   • Snores        Past Surgical History:   Past Surgical History:   Procedure Laterality Date   • CARDIAC ABLATION      2001   • HIP CANNULATED SCREW PLACEMENT Right 07/01/2021    Procedure: CANNULATED SCREWS FIXATION OF RIGHT HIP FRACTURE;  Surgeon: Ady Gomez MD;  Location: [facility];  Service: Orthopedics;  Laterality: Right;   • TONSILLECTOMY Bilateral    • VAGINAL DELIVERY N/A    • VAGINAL HYSTERECTOMY SALPINGO OOPHORECTOMY Bilateral        Family History:   Family History   Problem Relation Age of Onset   • Pancreatic cancer Paternal Aunt    • Colon cancer Neg Hx    • Cirrhosis Neg Hx    • Liver cancer Neg Hx    • Liver disease Neg Hx    • Stomach cancer Neg Hx    • Esophageal cancer Neg Hx        Social History:   Social History     Socioeconomic History   • Marital status:    Tobacco Use   • Smoking status: Every Day     Packs/day: 0.50     Types: Cigarettes     Start date: 1976   • Smokeless tobacco: Never   Vaping Use   • Vaping Use: Former   • Substances: Nicotine, Flavoring   • Devices: Disposable   Substance and Sexual Activity   • Alcohol use: Never   • Drug use: Never   • Sexual activity: Defer         Current Outpatient Medications:   •  ACETAMINOPHEN PO, Take  by mouth., Disp: , Rfl:   •  atorvastatin (LIPITOR) 80 MG tablet, Take 1 tablet by mouth Daily., Disp: , Rfl:   •  cyanocobalamin 1000 MCG/ML injection, Inject 1 mL into the appropriate muscle as directed by prescriber Every 30 (Thirty) Days., Disp: , Rfl:   •  estradiol (MINIVELLE, VIVELLE-DOT) 0.075 MG/24HR patch, Place 1  patch on the skin as directed by provider 2 (Two) Times a Week., Disp: , Rfl:   •  metaxalone (SKELAXIN) 800 MG tablet, Take 1 tablet by mouth 3 (Three) Times a Day., Disp: , Rfl:   •  midodrine (PROAMATINE) 5 MG tablet, Take 1 tablet by mouth 3 (Three) Times a Day., Disp: 90 tablet, Rfl: 0  •  pantoprazole (PROTONIX) 40 MG EC tablet, Take 1 tablet by mouth Daily., Disp: 30 tablet, Rfl: 11  •  pindolol (VISKEN) 5 MG tablet, Take 1 tablet by mouth 2 (Two) Times a Day., Disp: 60 tablet, Rfl: 0  •  pregabalin (LYRICA) 150 MG capsule, Take 1 capsule by mouth 3 (Three) Times a Day., Disp: , Rfl:   •  bisacodyl (DULCOLAX) 5 MG EC tablet, Take 4 tablets by mouth 1 (One) Time for 1 dose. Please see prep instructions from office., Disp: 4 tablet, Rfl: 0  •  dicyclomine (BENTYL) 20 MG tablet, Take 1 tablet by mouth 3 (Three) Times a Day As Needed (abdominal pain)., Disp: 60 tablet, Rfl: 2  •  polyethylene glycol (GoLYTELY) 236 g solution, Take 4,000 mL by mouth 1 (One) Time for 1 dose. Please see prep instructions from office., Disp: 4000 mL, Rfl: 0    Allergies   Allergen Reactions   • Isopropyl Nicotinate [Niacin] Anaphylaxis   • Wellbutrin [Bupropion] Dizziness   • Nsaids Rash       Review of Systems:   Review of Systems   Constitutional: Positive for fatigue. Negative for appetite change, fever and unexpected weight loss.   HENT: Negative for trouble swallowing.    Gastrointestinal: Positive for abdominal distention (swelling), abdominal pain, diarrhea (with tx H. pylori) and nausea. Negative for anal bleeding, blood in stool, constipation, rectal pain, vomiting, GERD and indigestion.       The following portions of the patient's history were reviewed and updated as appropriate: allergies, current medications, past family history, past medical history, past social history, past surgical history and problem list.    Objective     Physical Exam:  Vital Signs:   Vitals:    05/18/23 1447   BP: 121/66   Pulse: 79   Temp: 97.8  "°F (36.6 °C)   TempSrc: Infrared   Weight: 113 kg (250 lb)   Height: 170.2 cm (67\")  Comment: patient states       Physical Exam  Constitutional:       Appearance: She is obese.   HENT:      Head: Normocephalic and atraumatic.   Eyes:      Conjunctiva/sclera: Conjunctivae normal.   Abdominal:      General: Abdomen is flat. There is no distension.      Palpations: There is no mass.      Tenderness: There is abdominal tenderness (Mild right lower quadrant discomfort on deep palpation.). There is no guarding or rebound.      Hernia: No hernia is present.   Musculoskeletal:      Cervical back: Normal range of motion and neck supple.   Neurological:      Mental Status: She is alert.           Results Review:   I have reviewed the patient's new clinical and imaging results and agree with the interpretation.     No visits with results within 90 Day(s) from this visit.   Latest known visit with results is:   Admission on 07/01/2021, Discharged on 07/01/2021   Component Date Value Ref Range Status   • SARS-CoV-2 VINCENT 06/29/2021 Not Detected  Not Detected Final      US Abdomen Limited    Result Date: 5/4/2023  Fatty liver, otherwise unremarkable. Authenticated and Electronically Signed by Marek Gutierrez M.D. on 05/04/2023 06:16:28 PM      Assessment / Plan      Assessment & Plan:  1. Change in bowel habits  2. Lower abdominal pain  3. Right upper quadrant abdominal pain  4.  Suspected irritable bowel syndrome with diarrhea  Patient history, prior work-up and examination today is more suggestive of irritable bowel syndrome with diarrhea.  Other etiologies need to be ruled out.  Lab work done in March 2023 reviewed including CBC CMP were unremarkable.  Celiac serology was negative.  Ultrasound abdomen done in May 2023 revealed only showed fatty liver disease without any liver lesions.    Her dietary habit is not healthy.  She is also a chronic smoker and both these significantly contributing to her symptoms    Lifestyle changes and " dietary changes discussed  Low gas-forming diet instructions given  Daily exercise discussed  Patient may be a candidate for GLP-1 agonist therapy to lose weight.  To discuss with PCP  We will get a stool calprotectin and fecal elastase  Metamucil 2-4 fiber capsules p.o. daily  Trial of probiotic can be considered  Bentyl 20 mg p.o. 3 times daily as needed  She needs a EGD colonoscopy for further evaluation     The indications, technique, alternatives and potential risk and complications were discussed with the patient including but not limited to bleeding, bowel perforations, missing lesions and anesthetic complications. The patient understands and wishes to proceed with the procedure and has given their verbal consent. Written patient education information was given to the patient.   The patient will call if they have further questions before procedure.     - Calprotectin, Fecal - Stool, Per Rectum; Future  - Pancreatic Elastase, Fecal - Stool, Per Rectum; Future  - H. Pylori Antigen, Stool - Stool, Per Rectum; Future    5. Gastroesophageal reflux disease without esophagitis  Currently on a PPI with good symptom control.  She has been scheduled for EGD Will recommend further after EGD    6. Non-alcoholic fatty liver disease  7. Class 2 obesity due to excess calories without serious comorbidity with body mass index (BMI) of 39.0 to 39.9 in adult  Recent lab work done in March 2023 reviewed which reveals normal liver enzymes.  Ultrasound done in May 2023 revealed fatty liver disease without any liver lesions.  Lifestyle changes, dietary changes discussed to lose weight to prevent the progression of the liver disease    8. Encounter for screening for malignant neoplasm of colon  No prior colonoscopy, no family history of any colon cancer  We will schedule colonoscopy.      Follow Up:   Return in about 3 months (around 8/18/2023).        Preston Amos MD  [facility]  5/18/2023  17:58  EDT    Please note that portions of this note may have been completed with a voice recognition program.

## 2023-05-19 ENCOUNTER — PATIENT ROUNDING (BHMG ONLY) (OUTPATIENT)
Dept: GASTROENTEROLOGY | Facility: CLINIC | Age: 63
End: 2023-05-19
Payer: MEDICAID

## 2023-05-19 PROBLEM — K21.9 GASTROESOPHAGEAL REFLUX DISEASE WITHOUT ESOPHAGITIS: Status: ACTIVE | Noted: 2023-05-19

## 2023-05-19 PROBLEM — R10.11 RIGHT UPPER QUADRANT ABDOMINAL PAIN: Status: ACTIVE | Noted: 2023-05-19

## 2023-05-19 PROBLEM — Z12.11 ENCOUNTER FOR SCREENING FOR MALIGNANT NEOPLASM OF COLON: Status: ACTIVE | Noted: 2023-05-19

## 2023-05-19 NOTE — PROGRESS NOTES
May 19, 2023    Hello, may I speak with Zulay Boyd Silvia?    My name is Amairani    I am  with [facility]  [address]  [place] [address] [phone].    Before we get started may I verify your date of birth? 1960    I am calling to officially welcome you to our practice and ask about your recent visit. Is this a good time to talk? yes    Tell me about your visit with us. What things went well?    I thought he was very good and caring     We're always looking for ways to make our patients' experiences even better. Do you have recommendations on ways we may improve?  no    Overall were you satisfied with your first visit to our practice? yes       I appreciate you taking the time to speak with me today. Is there anything else I can do for you?   no    Thank you, and have a great day.

## 2023-07-27 NOTE — PAT
"Called anesthesia phone and spoke with Juanpablo Carrasco CRNA.  Informed that pt is scheduled for a procedure tomorrow with Dr. Amos.  Reviewed pt PMH with CRNA.  Informed that pt was seen at the E.R. at  in Nov. 2022 R/T chest pain.  Pt had elevated troponin levels at that time.  Pt had an echocardiogram on 1/6/23 and saw her cardiologist on 1/26/23.  Per the cardiology note, it is noted:  \"will consider NMST at f/u as ER cardiomarker w/o delta and description sounds atypical\".  When pt was asked about the stress test by RN, she stated that she didn't see Dr. Ramey that day,  and that he had told her in the past to never have a stress test.  Pt states that her heart almost stopped during a tilt table test and that it did stop during a cardiac ablation (states was due to a medication she was given during the procedure). MET score greater than 4.  Pt states that she has intermittent chest pain that has remained unchanged x 30 years (with the exception of her episode in Nov. 2022).   Juanpablo stated that the pt may proceed as scheduled.    "

## 2023-07-27 NOTE — PRE-PROCEDURE INSTRUCTIONS
PAT phone history completed with pt for upcoming procedure on 7/28/23 with Dr. Amos.      PAT PASS GIVEN/REVIEWED WITH PT.  VERBALIZED UNDERSTANDING OF THE FOLLOWING:  DO NOT EAT, DRINK, SMOKE, USE SMOKELESS TOBACCO OR CHEW GUM AFTER MIDNIGHT THE NIGHT BEFORE SURGERY.  THIS ALSO INCLUDES HARD CANDIES AND MINTS.    DO NOT SHAVE THE AREA TO BE OPERATED ON AT LEAST 48 HOURS PRIOR TO THE PROCEDURE.  DO NOT WEAR MAKE UP OR NAIL POLISH.  DO NOT LEAVE IN ANY PIERCING OR WEAR JEWELRY THE DAY OF SURGERY.      DO NOT USE ADHESIVES IF YOU WEAR DENTURES.    DO NOT WEAR EYE CONTACTS; BRING IN YOUR GLASSES.    ONLY TAKE MEDICATION THE MORNING OF YOUR PROCEDURE IF INSTRUCTED BY YOUR SURGEON WITH ENOUGH WATER TO SWALLOW THE MEDICATION.  IF YOUR SURGEON DID NOT SPECIFY WHICH MEDICATIONS TO TAKE, YOU WILL NEED TO CALL THEIR OFFICE FOR FURTHER INSTRUCTIONS AND DO AS THEY INSTRUCT.    LEAVE ANYTHING YOU CONSIDER VALUABLE AT HOME.    YOU WILL NEED TO ARRANGE FOR SOMEONE TO DRIVE YOU HOME AFTER SURGERY.  IT IS RECOMMENDED THAT YOU DO NOT DRIVE, WORK, DRINK ALCOHOL OR MAKE MAJOR DECISIONS FOR AT LEAST 24 HOURS AFTER YOUR PROCEDURE IS COMPLETE.      THE DAY OF YOUR PROCEDURE, BRING IN THE FOLLOWING IF APPLICABLE:   PICTURE ID AND INSURANCE/MEDICARE OR MEDICAID CARDS/ANY CO-PAY THAT MAY BE DUE   COPY OF ADVANCED DIRECTIVE/LIVING WILL/POWER OR    CPAP/BIPAP/INHALERS   SKIN PREP SHEET   YOUR PREADMISSION TESTING PASS (IF NOT A PHONE HISTORY)

## 2023-07-28 ENCOUNTER — ANESTHESIA EVENT (OUTPATIENT)
Dept: GASTROENTEROLOGY | Facility: HOSPITAL | Age: 63
End: 2023-07-28
Payer: MEDICAID

## 2023-07-28 ENCOUNTER — ANESTHESIA (OUTPATIENT)
Dept: GASTROENTEROLOGY | Facility: HOSPITAL | Age: 63
End: 2023-07-28
Payer: MEDICAID

## 2023-07-28 ENCOUNTER — HOSPITAL ENCOUNTER (OUTPATIENT)
Facility: HOSPITAL | Age: 63
Setting detail: HOSPITAL OUTPATIENT SURGERY
Discharge: HOME | End: 2023-07-28
Attending: INTERNAL MEDICINE | Admitting: INTERNAL MEDICINE
Payer: MEDICAID

## 2023-07-28 VITALS
DIASTOLIC BLOOD PRESSURE: 66 MMHG | RESPIRATION RATE: 16 BRPM | BODY MASS INDEX: 38.92 KG/M2 | WEIGHT: 248 LBS | OXYGEN SATURATION: 95 % | HEIGHT: 67 IN | TEMPERATURE: 96.8 F | SYSTOLIC BLOOD PRESSURE: 107 MMHG | HEART RATE: 79 BPM

## 2023-07-28 DIAGNOSIS — Z12.11 ENCOUNTER FOR SCREENING FOR MALIGNANT NEOPLASM OF COLON: ICD-10-CM

## 2023-07-28 DIAGNOSIS — R10.11 RIGHT UPPER QUADRANT ABDOMINAL PAIN: ICD-10-CM

## 2023-07-28 DIAGNOSIS — K21.9 GASTROESOPHAGEAL REFLUX DISEASE WITHOUT ESOPHAGITIS: ICD-10-CM

## 2023-07-28 PROCEDURE — 25010000002 PROPOFOL 200 MG/20ML EMULSION: Performed by: NURSE ANESTHETIST, CERTIFIED REGISTERED

## 2023-07-28 PROCEDURE — 25010000002 PROPOFOL 1000 MG/100ML EMULSION: Performed by: NURSE ANESTHETIST, CERTIFIED REGISTERED

## 2023-07-28 PROCEDURE — 43239 EGD BIOPSY SINGLE/MULTIPLE: CPT | Performed by: INTERNAL MEDICINE

## 2023-07-28 PROCEDURE — 45380 COLONOSCOPY AND BIOPSY: CPT | Performed by: INTERNAL MEDICINE

## 2023-07-28 PROCEDURE — 45385 COLONOSCOPY W/LESION REMOVAL: CPT | Performed by: INTERNAL MEDICINE

## 2023-07-28 RX ORDER — SODIUM CHLORIDE 9 MG/ML
70 INJECTION, SOLUTION INTRAVENOUS CONTINUOUS PRN
Status: DISCONTINUED | OUTPATIENT
Start: 2023-07-28 | End: 2023-07-28 | Stop reason: HOSPADM

## 2023-07-28 RX ORDER — LIDOCAINE HYDROCHLORIDE 20 MG/ML
INJECTION, SOLUTION INTRAVENOUS AS NEEDED
Status: DISCONTINUED | OUTPATIENT
Start: 2023-07-28 | End: 2023-07-28 | Stop reason: SURG

## 2023-07-28 RX ORDER — PROPOFOL 10 MG/ML
INJECTION, EMULSION INTRAVENOUS AS NEEDED
Status: DISCONTINUED | OUTPATIENT
Start: 2023-07-28 | End: 2023-07-28 | Stop reason: SURG

## 2023-07-28 RX ORDER — SIMETHICONE 20 MG/.3ML
EMULSION ORAL AS NEEDED
Status: DISCONTINUED | OUTPATIENT
Start: 2023-07-28 | End: 2023-07-28 | Stop reason: HOSPADM

## 2023-07-28 RX ORDER — PROPOFOL 10 MG/ML
INJECTION, EMULSION INTRAVENOUS CONTINUOUS PRN
Status: DISCONTINUED | OUTPATIENT
Start: 2023-07-28 | End: 2023-07-28 | Stop reason: SURG

## 2023-07-28 RX ADMIN — PROPOFOL 125 MG: 10 INJECTION, EMULSION INTRAVENOUS at 11:36

## 2023-07-28 RX ADMIN — PROPOFOL 150 MCG/KG/MIN: 10 INJECTION, EMULSION INTRAVENOUS at 11:38

## 2023-07-28 RX ADMIN — LIDOCAINE HYDROCHLORIDE 60 MG: 20 INJECTION, SOLUTION INTRAVENOUS at 11:36

## 2023-07-28 RX ADMIN — SODIUM CHLORIDE 70 ML/HR: 9 INJECTION, SOLUTION INTRAVENOUS at 10:47

## 2023-07-28 NOTE — ANESTHESIA PREPROCEDURE EVALUATION
Anesthesia Evaluation     Patient summary reviewed and Nursing notes reviewed   no history of anesthetic complications:   NPO Solid Status: > 8 hours  NPO Liquid Status: > 8 hours           Airway   Mallampati: II  TM distance: >3 FB  Neck ROM: full  Possible difficult intubation and Large neck circumference  Dental - normal exam   (+) poor dentition    Pulmonary    (+) a smoker Current, COPD,shortness of breath, sleep apnea, decreased breath sounds  Cardiovascular - normal exam  Exercise tolerance: good (4-7 METS)    ECG reviewed    (+) past MI ( unknown, mi per ekg) dysrhythmias Tachycardia, angina with exertion, MALLORY  CAD:  inc risk.    ROS comment: 1/6/23 echo-   Left Ventricle: The left ventricle is normal size. There is normal left   ventricular myocardial thickness and mass. The left ventricular systolic   function is normal. The LVEF as measured by biplane volume is 61%. No   regional wall motion abnormalities are seen. The diastolic function is   abnormal. There is grade I (mild) diastolic dysfunction. The left   ventricular filling pressure is indeterminate.   ·  Pericardium: No pericardial effusion.   ·  There is no recent study available for direct [id number] comparison.       Neuro/Psych  (+) seizures, headaches, dizziness/light headedness, psychiatric history Depression and Anxiety  GI/Hepatic/Renal/Endo    (+) obesity, GERD, thyroid problem     Musculoskeletal     (+) arthralgias, back pain, chronic pain, myalgias  Abdominal   (+) obese    Bowel sounds: normal.   Substance History - negative useDrug use:  ? use.     OB/GYN negative ob/gyn ROS         Other   arthritis,                     Anesthesia Plan    ASA 4     MAC     (Risks and benefits discussed including risk of aspiration, recall and dental damage. All patient questions answered.  )  intravenous induction     Anesthetic plan, risks, benefits, and alternatives have been provided, discussed and informed consent has been obtained with:  patient.  Pre-procedure education provided  Plan discussed with CRNA.

## 2023-07-28 NOTE — H&P
[facility]  HISTORY AND PHYSICAL    Patient Name: Zulay Vega  : 1960  MRN: [id number]    Chief Complaint:   For screening colonoscopy/ EGD    History Of Presenting Illness:    GERD  Upper abdominal pain   Lower abdominal pain  Change in bowel habit  Colon screening average risk     Past Medical History:   Diagnosis Date    Angina pectoris 2021    Anxiety and depression     Arthritis     Asystole     coded during a cardiac ablation    AV yesenia tachycardia     with svt    Blurred vision     Chest pain 2022    was seen at [facility] that her heart checked out ok.    Dizziness     GERD (gastroesophageal reflux disease)     Heart disease     Hypotension     orthostatic    Hypothyroidism     Migraines     Mitral valve disease 2021    MRSA (methicillin resistant staph aureus) culture positive 2016    ARMS    Osteoporosis 2021    Pain in joint involving multiple sites     Poor historian 2021    POTS (postural orthostatic tachycardia syndrome)     Seizures 2016    states were stress induced and then went away    Snores     Wears dentures     upper plate only       Past Surgical History:   Procedure Laterality Date    CARDIAC ABLATION      1993    HIP CANNULATED SCREW PLACEMENT Right 2021    Procedure: CANNULATED SCREWS FIXATION OF RIGHT HIP FRACTURE;  Surgeon: Ady Gomez MD;  Location: [facility];  Service: Orthopedics;  Laterality: Right;    TONSILLECTOMY Bilateral     VAGINAL DELIVERY N/A     VAGINAL HYSTERECTOMY SALPINGO OOPHORECTOMY Bilateral     WISDOM TOOTH EXTRACTION         Social History     Socioeconomic History    Marital status:    Tobacco Use    Smoking status: Every Day     Packs/day: 0.50     Years: 30.00     Pack years: 15.00     Types: Cigarettes     Start date:     Smokeless tobacco: Never   Vaping Use    Vaping Use: Former    Substances: Nicotine, Flavoring    Devices: Disposable   Substance and Sexual Activity     Alcohol use: Never    Drug use: Never    Sexual activity: Defer       Family History   Problem Relation Age of Onset    Cancer Mother     Throat cancer Mother     Diabetes Father     Heart disease Father     Suicidality Brother     Cancer Paternal Aunt     Pancreatic cancer Paternal Aunt     Leukemia Son     Hypertension Sister     Cancer Maternal Aunt     Breast cancer Maternal Aunt     Colon cancer Neg Hx     Cirrhosis Neg Hx     Liver cancer Neg Hx     Liver disease Neg Hx     Stomach cancer Neg Hx     Esophageal cancer Neg Hx        Prior to Admission Medications:  Medications Prior to Admission   Medication Sig Dispense Refill Last Dose    ACETAMINOPHEN PO Take 1,000 mg by mouth Every 6 (Six) Hours As Needed.   Past Week    atorvastatin (LIPITOR) 80 MG tablet Take 1 tablet by mouth Daily.   7/27/2023    cyanocobalamin 1000 MCG/ML injection Inject 1 mL into the appropriate muscle as directed by prescriber Every 30 (Thirty) Days.   Past Week    dicyclomine (BENTYL) 20 MG tablet Take 1 tablet by mouth 3 (Three) Times a Day As Needed (abdominal pain). 60 tablet 2 Past Week    estradiol (MINIVELLE, VIVELLE-DOT) 0.075 MG/24HR patch Place 1 patch on the skin as directed by provider 2 (Two) Times a Week.   7/27/2023    metaxalone (SKELAXIN) 800 MG tablet Take 1 tablet by mouth 3 (Three) Times a Day.   7/28/2023    midodrine (PROAMATINE) 5 MG tablet Take 1 tablet by mouth 3 (Three) Times a Day. 90 tablet 0 7/28/2023    pantoprazole (PROTONIX) 40 MG EC tablet Take 1 tablet by mouth Daily. 30 tablet 11 7/28/2023    pindolol (VISKEN) 5 MG tablet Take 1 tablet by mouth 2 (Two) Times a Day. 60 tablet 0 7/28/2023 at 0730    pregabalin (LYRICA) 150 MG capsule Take 1 capsule by mouth 3 (Three) Times a Day.   7/28/2023       Allergies:  Allergies   Allergen Reactions    Isuprel [Isoproterenol] Anaphylaxis     States that this caused her to code during a cardiac ablation.      Nsaids Rash    Wellbutrin [Bupropion] Dizziness         Vitals: Temp:  [97 °F (36.1 °C)] 97 °F (36.1 °C)  Heart Rate:  [87] 87  Resp:  [12] 12  BP: (118)/(81) 118/81    Review Of Systems:  Constitutional:  Negative for chills, fever, and unexpected weight change.  Respiratory:  Negative for cough, chest tightness, shortness of breath, and wheezing.  Cardiovascular:  Negative for chest pain, palpitations, and leg swelling.  Gastrointestinal:  Negative for abdominal distention, abdominal pain, nausea, vomiting.  Neurological:  Negative for weakness, numbness, and headaches.     Physical Exam:    General Appearance:  Alert, cooperative, in no acute distress.   Lungs:   Clear to auscultation, respirations regular, even and                 unlabored.   Heart:  Regular rhythm and normal rate.   Abdomen:   Normal bowel sounds, no masses, no organomegaly. Soft, nontender, nondistended   Neurologic: Alert and oriented x 3. Moves all four limbs equally       Assessment & Plan     Assessment:  Active Problems:    Right upper quadrant abdominal pain    Gastroesophageal reflux disease without esophagitis    Encounter for screening for malignant neoplasm of colon      Plan: ESOPHAGOGASTRODUODENOSCOPY (N/A), COLONOSCOPY (N/A)     Preston Amos MD  7/28/2023

## 2023-07-28 NOTE — ANESTHESIA POSTPROCEDURE EVALUATION
Patient: Zulay Vega    Procedure Summary       Date: 07/28/23 Room / Location: [facility] ENDOSCOPY 1 / [facility] ENDOSCOPY    Anesthesia Start: 1130 Anesthesia Stop: 1214    Procedures:       ESOPHAGOGASTRODUODENOSCOPY WITH BIOPSY (Esophagus)      COLONOSCOPY WITH BIOPSY AND POLYPECTOMY (Anus) Diagnosis:       Right upper quadrant abdominal pain      Gastroesophageal reflux disease without esophagitis      Encounter for screening for malignant neoplasm of colon      (Right upper quadrant abdominal pain [R10.11])      (Gastroesophageal reflux disease without esophagitis [K21.9])      (Encounter for screening for malignant neoplasm of colon [Z12.11])    Surgeons: Preston Amos MD Provider: Real Alcantara CRNA    Anesthesia Type: MAC ASA Status: 4            Anesthesia Type: MAC    Vitals  No vitals data found for the desired time range.          Post Anesthesia Care and Evaluation    Patient location during evaluation: bedside  Patient participation: complete - patient participated  Level of consciousness: awake and alert  Pain score: 0  Pain management: satisfactory to patient    Airway patency: patent  Anesthetic complications: No anesthetic complications  PONV Status: none  Cardiovascular status: acceptable and stable  Respiratory status: acceptable  Hydration status: acceptable    Comments: Vitals signs as noted in nursing documentation as per protocol.

## 2023-07-28 NOTE — DISCHARGE INSTRUCTIONS
- Discharge patient to home (ambulatory).   - High fiber diet.   - Continue present medications.   - Await pathology results.   - Repeat colonoscopy in 7-10 years for surveillance.   - Return to GI office in 8 weeks.   No pushing, pulling, tugging,  heavy lifting, or strenuous activity.  No major decision making, driving, or drinking alcoholic beverages for 24 hours. ( due to the medications you have  received)  Always use good hand hygiene/washing techniques.  NO driving while taking pain medications.    * if you have an incision:  Check your incision area every day for signs of infection.   Check for:  * more redness, swelling, or pain  *more fluid or blood  *warmth  *pus or bad smell  To assist you in voiding:  Drink plenty of fluids  Listen to running water while attempting to void.    If you are unable to urinate and you have an uncomfortable urge to void or it has been   6 hours since you were discharged, return to the Emergency Room

## 2023-07-31 LAB — REF LAB TEST METHOD: NORMAL

## 2023-09-20 ENCOUNTER — LAB (OUTPATIENT)
Dept: LAB | Facility: HOSPITAL | Age: 63
End: 2023-09-20
Payer: MEDICAID

## 2023-09-20 ENCOUNTER — OFFICE VISIT (OUTPATIENT)
Dept: GASTROENTEROLOGY | Facility: CLINIC | Age: 63
End: 2023-09-20
Payer: MEDICAID

## 2023-09-20 VITALS
TEMPERATURE: 97.8 F | WEIGHT: 246 LBS | HEART RATE: 85 BPM | HEIGHT: 67 IN | BODY MASS INDEX: 38.61 KG/M2 | SYSTOLIC BLOOD PRESSURE: 137 MMHG | DIASTOLIC BLOOD PRESSURE: 76 MMHG

## 2023-09-20 DIAGNOSIS — R19.4 CHANGE IN BOWEL HABIT: ICD-10-CM

## 2023-09-20 DIAGNOSIS — K21.00 GASTROESOPHAGEAL REFLUX DISEASE WITH ESOPHAGITIS WITHOUT HEMORRHAGE: ICD-10-CM

## 2023-09-20 DIAGNOSIS — R10.31 RIGHT LOWER QUADRANT ABDOMINAL PAIN: ICD-10-CM

## 2023-09-20 DIAGNOSIS — R10.31 RIGHT LOWER QUADRANT ABDOMINAL PAIN: Primary | ICD-10-CM

## 2023-09-20 LAB
ALBUMIN SERPL-MCNC: 4.4 G/DL (ref 3.5–5.2)
ALBUMIN/GLOB SERPL: 1.4 G/DL
ALP SERPL-CCNC: 105 U/L (ref 39–117)
ALT SERPL W P-5'-P-CCNC: 17 U/L (ref 1–33)
ANION GAP SERPL CALCULATED.3IONS-SCNC: 14.8 MMOL/L (ref 5–15)
AST SERPL-CCNC: 25 U/L (ref 1–32)
BILIRUB SERPL-MCNC: 0.2 MG/DL (ref 0–1.2)
BUN SERPL-MCNC: 15 MG/DL (ref 8–23)
BUN/CREAT SERPL: 13.3 (ref 7–25)
CALCIUM SPEC-SCNC: 9.6 MG/DL (ref 8.6–10.5)
CHLORIDE SERPL-SCNC: 106 MMOL/L (ref 98–107)
CO2 SERPL-SCNC: 18.2 MMOL/L (ref 22–29)
CREAT SERPL-MCNC: 1.13 MG/DL (ref 0.57–1)
EGFRCR SERPLBLD CKD-EPI 2021: 55.5 ML/MIN/1.73
GLOBULIN UR ELPH-MCNC: 3.2 GM/DL
GLUCOSE SERPL-MCNC: 85 MG/DL (ref 65–99)
IGA1 MFR SER: 185 MG/DL (ref 70–400)
POTASSIUM SERPL-SCNC: 4.7 MMOL/L (ref 3.5–5.2)
PROT SERPL-MCNC: 7.6 G/DL (ref 6–8.5)
SODIUM SERPL-SCNC: 139 MMOL/L (ref 136–145)

## 2023-09-20 PROCEDURE — 1160F RVW MEDS BY RX/DR IN RCRD: CPT | Performed by: INTERNAL MEDICINE

## 2023-09-20 PROCEDURE — 99214 OFFICE O/P EST MOD 30 MIN: CPT | Performed by: INTERNAL MEDICINE

## 2023-09-20 PROCEDURE — 86364 TISS TRNSGLTMNASE EA IG CLAS: CPT

## 2023-09-20 PROCEDURE — 1159F MED LIST DOCD IN RCRD: CPT | Performed by: INTERNAL MEDICINE

## 2023-09-20 PROCEDURE — 85025 COMPLETE CBC W/AUTO DIFF WBC: CPT

## 2023-09-20 PROCEDURE — 80053 COMPREHEN METABOLIC PANEL: CPT

## 2023-09-20 PROCEDURE — 82784 ASSAY IGA/IGD/IGG/IGM EACH: CPT

## 2023-09-20 PROCEDURE — 36415 COLL VENOUS BLD VENIPUNCTURE: CPT

## 2023-09-20 NOTE — PROGRESS NOTES
Follow Up Note     Date: 2023   Patient Name: Zulay Vega  MRN: [id number]  : 1960     Referring Physician: Jackelyn Ramey APRN    Chief Complaint:    Chief Complaint   Patient presents with    Abdominal Pain    Heartburn    NAFLD    IBS-D       Interval History:   2023  Zulay Vega is a 63 y.o. female who is here today for follow up for her IBS symptoms.  States that she lost about 4 pounds.  She changed her diet that helped her symptoms however she still gets right-sided lower abdominal discomfort intermittently.  She had a EGD colonoscopy she is here for follow-up.    2023   Zulay Vega is a 63 y.o. female who is here today to establish care with Gastroenterology for evaluation for ongoing abdominal pain change in bowel habit. Patient states that she has been having issues with the significant abdominal bloating, abdominal distention, right-sided lower abdominal pain over a year now which is not improving.     Her pain is mostly on the right side abdomen and also sometimes suprapubic region sometimes pain radiates to her back.  Pain intermittent present most days.  She feels bloated mostly on the right side abdomen.  She has been having bowel movement 2 to 3/day which very different in shape and sometimes floats.  Denies any blood in the stool.  No constipation as such.  Occasional nausea associated but no vomiting.  She is a chronic smoker and her dietary habit is also not healthy and drinks multiple cans of carbonated beverages at least 4 to 5 20 once beverages /day.  As per patient she also gained over 100 pounds since last 1 to 2 years.     She also has a history of gastroesophageal reflux disease and is well controlled with PPI daily that has been started by PCP in 2023.  She states that she had a blood work done which was positive for H. pylori for which he was given a triple therapy.  To note that H. pylori breath test was negative before the  antibiotic therapy.     No prior EGD or colonoscopy.  She is nonalcoholic.  No history of anemia.  No family history of colon cancer or GI malignancy.  Her lab work done in March 2023 revealed a normal CBC with a hemoglobin of 14 and normal CMP.  Celiac serology was negative.    Subjective      Past Medical History:   Past Medical History:   Diagnosis Date    Angina pectoris 06/29/2021    Anxiety and depression     Arthritis     Asystole 1993    coded during a cardiac ablation    AV yesenia tachycardia     with svt    Blurred vision     Chest pain 11/2022    was seen at [facility] that her heart checked out ok.    Dizziness     GERD (gastroesophageal reflux disease)     Heart disease     Hypotension     orthostatic    Hypothyroidism     Migraines     Mitral valve disease 06/29/2021    MRSA (methicillin resistant staph aureus) culture positive 2016    ARMS    Osteoporosis 06/29/2021    Pain in joint involving multiple sites     Poor historian 06/29/2021    POTS (postural orthostatic tachycardia syndrome)     Seizures 2016    states were stress induced and then went away    Snores     Wears dentures     upper plate only     Past Surgical History:   Past Surgical History:   Procedure Laterality Date    CARDIAC ABLATION      1993    COLONOSCOPY N/A 7/28/2023    Procedure: COLONOSCOPY WITH BIOPSY AND POLYPECTOMY;  Surgeon: Preston Amos MD;  Location: [facility] ENDOSCOPY;  Service: Gastroenterology;  Laterality: N/A;    ENDOSCOPY N/A 7/28/2023    Procedure: ESOPHAGOGASTRODUODENOSCOPY WITH BIOPSY;  Surgeon: Preston Amos MD;  Location: [facility] ENDOSCOPY;  Service: Gastroenterology;  Laterality: N/A;    HIP CANNULATED SCREW PLACEMENT Right 07/01/2021    Procedure: CANNULATED SCREWS FIXATION OF RIGHT HIP FRACTURE;  Surgeon: Ady Gomez MD;  Location: [facility] OR;  Service: Orthopedics;  Laterality: Right;    TONSILLECTOMY Bilateral     VAGINAL DELIVERY N/A     VAGINAL HYSTERECTOMY SALPINGO OOPHORECTOMY  Bilateral     WISDOM TOOTH EXTRACTION         Family History:   Family History   Problem Relation Age of Onset    Cancer Mother     Throat cancer Mother     Diabetes Father     Heart disease Father     Suicidality Brother     Cancer Paternal Aunt     Pancreatic cancer Paternal Aunt     Leukemia Son     Hypertension Sister     Cancer Maternal Aunt     Breast cancer Maternal Aunt     Colon cancer Neg Hx     Cirrhosis Neg Hx     Liver cancer Neg Hx     Liver disease Neg Hx     Stomach cancer Neg Hx     Esophageal cancer Neg Hx        Social History:   Social History     Socioeconomic History    Marital status:    Tobacco Use    Smoking status: Every Day     Packs/day: 0.50     Years: 30.00     Pack years: 15.00     Types: Cigarettes     Start date: 1976    Smokeless tobacco: Never   Vaping Use    Vaping Use: Former    Substances: Nicotine, Flavoring    Devices: Disposable   Substance and Sexual Activity    Alcohol use: Never    Drug use: Never    Sexual activity: Defer       Medications:     Current Outpatient Medications:     ACETAMINOPHEN PO, Take 1,000 mg by mouth Every 6 (Six) Hours As Needed., Disp: , Rfl:     Aspirin-Acetaminophen-Caffeine (GOODY HEADACHE PO), Take  by mouth., Disp: , Rfl:     atorvastatin (LIPITOR) 80 MG tablet, Take 1 tablet by mouth Daily., Disp: , Rfl:     estradiol (MINIVELLE, VIVELLE-DOT) 0.075 MG/24HR patch, Place 1 patch on the skin as directed by provider 2 (Two) Times a Week., Disp: , Rfl:     metaxalone (SKELAXIN) 800 MG tablet, Take 1 tablet by mouth 3 (Three) Times a Day., Disp: , Rfl:     midodrine (PROAMATINE) 5 MG tablet, Take 1 tablet by mouth 3 (Three) Times a Day., Disp: 90 tablet, Rfl: 0    pantoprazole (PROTONIX) 40 MG EC tablet, Take 1 tablet by mouth Daily., Disp: 30 tablet, Rfl: 11    pindolol (VISKEN) 5 MG tablet, Take 1 tablet by mouth 2 (Two) Times a Day., Disp: 60 tablet, Rfl: 0    pregabalin (LYRICA) 150 MG capsule, Take 1 capsule by mouth 3 (Three) Times a  "Day., Disp: , Rfl:     cyanocobalamin 1000 MCG/ML injection, Inject 1 mL into the appropriate muscle as directed by prescriber Every 30 (Thirty) Days. (Patient not taking: Reported on 9/20/2023), Disp: , Rfl:     Allergies:   Allergies   Allergen Reactions    Isuprel [Isoproterenol] Anaphylaxis     States that this caused her to code during a cardiac ablation.      Nsaids Rash     Patient states she can tolerate now without issue.     Wellbutrin [Bupropion] Dizziness       Review of Systems:   Review of Systems   Constitutional:  Positive for fatigue. Negative for appetite change, fever and unexpected weight loss.   HENT:  Negative for trouble swallowing.    Gastrointestinal:  Positive for constipation, diarrhea and rectal pain. Negative for abdominal distention, abdominal pain, anal bleeding, blood in stool, nausea, vomiting, GERD and indigestion.        \"Symptoms depend on what I eat.  I am frustrated, because I don't have energy, would like to be more active, I get down because I need to lose weight and cannot seem to get started.\"     The following portions of the patient's history were reviewed and updated as appropriate: allergies, current medications, past family history, past medical history, past social history, past surgical history and problem list.    Objective     Physical Exam:  Vital Signs:   Vitals:    09/20/23 1516   BP: 137/76   Pulse: 85   Temp: 97.8 °F (36.6 °C)   TempSrc: Infrared   Weight: 112 kg (246 lb)   Height: 170.2 cm (67\")  Comment: per patient.       Physical Exam  Constitutional:       Appearance: She is obese.   HENT:      Head: Normocephalic and atraumatic.   Eyes:      Conjunctiva/sclera: Conjunctivae normal.   Abdominal:      General: Abdomen is flat. There is no distension.      Palpations: There is no mass.      Tenderness: There is no abdominal tenderness. There is no guarding or rebound.      Hernia: No hernia is present.   Musculoskeletal:      Cervical back: Normal range of " motion and neck supple.   Neurological:      Mental Status: She is alert.       Results Review:   I reviewed the patient's new clinical results.    Admission on 2023, Discharged on 2023   Component Date Value Ref Range Status    Reference Lab Report 2023    Final                    Value:[facility]  [address] [place] [address]  Phone: [phone] or [phone]  Fax: [phone]  Franky Yeboah M.D., Medical Director    PATIENT NAME                           LABORATORY NO.  FREDDY PICKENS.                   [id number]  [id number]                         AGE              SEX  SSN           CLIENT REF #  [place] [id number]      1960  F    [id number]   [id number]    [address] [place] [address] BY-PASS                REQUESTING M.D.     ATTENDING M.D.     COPY TO.  [address]                        [facility]           [place] [address]                 [facility]  DATE COLLECTED      DATE RECEIVED      DATE REPORTED  2023    DIAGNOSIS:  A.   DUODENUM, BIOPSY:  Duodenal type mucosa with no significant histopathologic abnormalities  B.   ANTRUM AND BODY, BIOPSY:  Gastric antral type mucosa with mild chronic inactive gastritis  Negative                           for intestinal metaplasia or dysplasia  No Helicobacter pylori-like organisms seen  C.   TERMINAL ILEUM BIOPSY:  Small intestinal mucosa with no significant histopathologic abnormalities  D.   RANDOM COLON BIOPSIES, ASCENDING, TRANSVERSE, AND DESCENDING:  Colonic type mucosa with no significant histopathologic abnormalities  E.   RECTOSIGMOID COLON, POLYP, X2:  Hyperplastic polyps    CORNELIO    CLINICAL HISTORY:  Right upper quadrant abdominal pain, gastroesophageal reflux disease without  esophagitis, encounter for screening for malignant neoplasm of colon    SPECIMENS RECEIVED:  A.  DUODENUM, BIOPSY  B.  ANTRUM  "AND BODY, BIOPSY  C.  TERMINAL ILEUM BIOPSY  D.  RANDOM COLON BIOPSIES, ASCENDING, TRANSVERSE, AND DESCENDING  E.  RECTOSIGMOID COLON, POLYP, X2    MICROSCOPIC DESCRIPTION:  Tissue blocks are prepared and slides are examined microscopically on all  specimens. See diagnosis for details.    Professional interpretation rendered by Iraj Lozano M.D., CONSUELO. at [facility], [address] [place] [address].    GROSS DESCRIPTION:  A.  Labeled as \"duodenum\", consisting of multiple pieces of tan soft tissue  measuring 0.5 x 0.4 x 0.2 cm, submitted entirely in 1 cassette.  SOG  B.  Labeled as \"gastric antrum and body\", consisting of multiple pieces of tan  soft tissue measuring 0.4 x 0.4 x 0.2 cm, submitted entirely in 1 cassette.  C.  Labeled as \"terminal ileum\", consisting of 1 piece of tan soft tissue  measuring 0.3 x 0.2 x 0.2 cm, submitted entirely in 1 cassette.  D.  Labeled as \"random colon biopsies for ascending and transverse\",  consisting of multiple pieces of tan soft tissue measuring 0.8 x 0.7 x 0.2  cm, submitted entirely in 1 cassette.  E.  Labeled as \"rectosigmoid polyp x 2\", consisting of 2 pieces of tan soft  tissue measuring 0.6 x 0.5 x 0.2 cm, submitted entirely in 1 cassette.    REVIEWED, DIAGNOSED AND ELECTRONICALLY  SIGNED BY:    Iraj Lozano M.D., GAVIC.A.P.  CPT CODES:  88305x5     Office Visit on 06/30/2023   Component Date Value Ref Range Status    TSH 06/30/2023 1.490  0.270 - 4.200 uIU/mL Final    Free T4 06/30/2023 0.82 (L)  0.93 - 1.70 ng/dL Final    Hemoglobin A1C 06/30/2023 5.10  4.80 - 5.60 % Final      No radiology results for the last 90 days.    EGD 7/28/2023  - Normal oropharynx.  - Z-line regular, 33 cm from the incisors.  - 2 cm hiatal hernia.  - Distal Tortuous esophagus.  - No gross lesions in the entire esophagus.  - Mild healing Erosive gastropathy with no stigmata of recent bleeding. Biopsied.  - Normal duodenal bulb, first portion of the " duodenum, second portion of the duodenum and third portion of the duodenum    Colon 7/28/23  - Decreased sphincter tone found on digital rectal exam.  - Few small polyps at the recto-sigmoid colon,two removed with a cold snare. Resected and retrieved. Cliniclally  hyperplastic  - Diverticulosis in the sigmoid colon.  - The examined portion of the ileum was normal. Biopsied.  - Tortuous colon.  - Non-bleeding external and internal hemorrhoids.  - Biopsies were taken with a cold forceps for histology in the descending colon, in the transverse colon and in the  ascending colon.      Pathology  DIAGNOSIS:  A. DUODENUM, BIOPSY:  Duodenal type mucosa with no significant histopathologic abnormalities  B. ANTRUM AND BODY, BIOPSY:  Gastric antral type mucosa with mild chronic inactive gastritis  Negative for intestinal metaplasia or dysplasia  No Helicobacter pylori-like organisms seen  C. TERMINAL ILEUM BIOPSY:  Small intestinal mucosa with no significant histopathologic abnormalities  D. RANDOM COLON BIOPSIES, ASCENDING, TRANSVERSE, AND DESCENDING:  Colonic type mucosa with no significant histopathologic abnormalities  E. RECTOSIGMOID COLON, POLYP, X2:  Hyperplastic polyps    Assessment / Plan      1.  Right-sided abdominal pain  2.  Suspected irritable bowel syndrome mixed type with diarrhea and constipation.   9/20/2023  She changed to some of her diet that has helped on her bowel movement.  Bread spicy foods cause her significant symptoms.  Now she is having some constipation.  Right-sided abdominal pain still persists.  She lost about 4 pounds.  EGD colonoscopy done on 7/28/2023 did not reveal any endoscopic signs that could explain her symptoms.  Duodenal biopsies were normal.  Random colon and TI biopsies were normal too.  She did not get her stool studies done as advised.    Continue Metamucil as before  MiraLAX 17 g p.o. daily as needed for constipation  Dicyclomine 3 times daily as needed for cramps  We will get a  celiac serology, CBC CMP.  Stool studies as advised to be done today  Given her persistent right lower abdominal pain we will get a CT scan abdomen pelvis for further evaluation    5/18/2023  Patient history, prior work-up and examination today is more suggestive of irritable bowel syndrome with diarrhea.  Other etiologies need to be ruled out.  Lab work done in March 2023 reviewed including CBC CMP were unremarkable.  Celiac serology was negative.  Ultrasound abdomen done in May 2023 revealed only showed fatty liver disease without any liver lesions. Her dietary habit is not healthy.  She is also a chronic smoker and both these significantly contributing to her symptoms     3. Gastroesophageal reflux disease without esophagitis  EGD done on 7/28/2023 did not reveal any significant gastritis or gross esophagitis.  Reflux diet discussed with patient and will change her PPI to Protonix 40 mg p.o. as needed on demand     Prior history  4. Non-alcoholic fatty liver disease  5. Class 2 obesity due to excess calories without serious comorbidity with body mass index (BMI) of 39.0 to 39.9 in adult  Recent lab work done in March 2023 reviewed which reveals normal liver enzymes.  Ultrasound done in May 2023 revealed fatty liver disease without any liver lesions. Lifestyle changes, dietary changes discussed to lose weight to prevent the progression of the liver disease     6. Encounter for screening for malignant neoplasm of colon  No prior colonoscopy, no family history of any colon cancer  Colonoscopy on 07/20/2023 revealed only hyperplastic polyp..  Repeat colonoscopy in 10 years time in 2033 or earlier       Follow Up:   No follow-ups on file.    Preston Amos MD  [facility] [place]  9/20/2023  15:19 EDT     Please note that portions of this note may have been completed with a voice recognition program.

## 2023-09-21 LAB
BASOPHILS # BLD AUTO: 0.05 10*3/MM3 (ref 0–0.2)
BASOPHILS NFR BLD AUTO: 0.5 % (ref 0–1.5)
DEPRECATED RDW RBC AUTO: 43.8 FL (ref 37–54)
EOSINOPHIL # BLD AUTO: 0.39 10*3/MM3 (ref 0–0.4)
EOSINOPHIL NFR BLD AUTO: 3.8 % (ref 0.3–6.2)
ERYTHROCYTE [DISTWIDTH] IN BLOOD BY AUTOMATED COUNT: 13.2 % (ref 12.3–15.4)
HCT VFR BLD AUTO: 38.8 % (ref 34–46.6)
HGB BLD-MCNC: 13.1 G/DL (ref 12–15.9)
IMM GRANULOCYTES # BLD AUTO: 0.04 10*3/MM3 (ref 0–0.05)
IMM GRANULOCYTES NFR BLD AUTO: 0.4 % (ref 0–0.5)
LYMPHOCYTES # BLD AUTO: 2.87 10*3/MM3 (ref 0.7–3.1)
LYMPHOCYTES NFR BLD AUTO: 28 % (ref 19.6–45.3)
MCH RBC QN AUTO: 30.5 PG (ref 26.6–33)
MCHC RBC AUTO-ENTMCNC: 33.8 G/DL (ref 31.5–35.7)
MCV RBC AUTO: 90.4 FL (ref 79–97)
MONOCYTES # BLD AUTO: 0.87 10*3/MM3 (ref 0.1–0.9)
MONOCYTES NFR BLD AUTO: 8.5 % (ref 5–12)
NEUTROPHILS NFR BLD AUTO: 58.8 % (ref 42.7–76)
NEUTROPHILS NFR BLD AUTO: 6.02 10*3/MM3 (ref 1.7–7)
NRBC BLD AUTO-RTO: 0 /100 WBC (ref 0–0.2)
PLATELET # BLD AUTO: 216 10*3/MM3 (ref 140–450)
PMV BLD AUTO: 12.5 FL (ref 6–12)
RBC # BLD AUTO: 4.29 10*6/MM3 (ref 3.77–5.28)
WBC NRBC COR # BLD: 10.24 10*3/MM3 (ref 3.4–10.8)

## 2023-09-22 LAB — TTG IGA SER-ACNC: <2 U/ML (ref 0–3)

## 2023-10-05 ENCOUNTER — HOSPITAL ENCOUNTER (OUTPATIENT)
Dept: CT IMAGING | Facility: HOSPITAL | Age: 63
Discharge: HOME | End: 2023-10-05
Admitting: INTERNAL MEDICINE
Payer: MEDICAID

## 2023-10-05 DIAGNOSIS — R19.4 CHANGE IN BOWEL HABIT: ICD-10-CM

## 2023-10-05 DIAGNOSIS — R10.31 RIGHT LOWER QUADRANT ABDOMINAL PAIN: ICD-10-CM

## 2023-10-05 PROCEDURE — 25510000001 IOPAMIDOL 61 % SOLUTION: Performed by: INTERNAL MEDICINE

## 2023-10-05 PROCEDURE — 74177 CT ABD & PELVIS W/CONTRAST: CPT

## 2023-10-05 PROCEDURE — 82653 EL-1 FECAL QUANTITATIVE: CPT

## 2023-10-05 PROCEDURE — 83993 ASSAY FOR CALPROTECTIN FECAL: CPT

## 2023-10-05 PROCEDURE — 87338 HPYLORI STOOL AG IA: CPT

## 2023-10-05 RX ADMIN — IOPAMIDOL 100 ML: 612 INJECTION, SOLUTION INTRAVENOUS at 17:38

## 2023-10-08 LAB — H PYLORI AG STL QL IA: NEGATIVE

## 2023-10-09 LAB — CALPROTECTIN STL-MCNT: 122 UG/G (ref 0–120)

## 2023-10-12 LAB — ELASTASE PANC STL-MCNT: 452 UG ELAST./G

## 2023-10-17 DIAGNOSIS — K59.00 CONSTIPATION, UNSPECIFIED CONSTIPATION TYPE: ICD-10-CM

## 2023-10-17 DIAGNOSIS — R10.84 GENERALIZED ABDOMINAL PAIN: ICD-10-CM

## 2023-10-17 DIAGNOSIS — R19.5 ELEVATED FECAL CALPROTECTIN: Primary | ICD-10-CM

## 2023-10-20 ENCOUNTER — TELEPHONE (OUTPATIENT)
Dept: GASTROENTEROLOGY | Facility: CLINIC | Age: 63
End: 2023-10-20
Payer: MEDICAID

## 2023-10-20 NOTE — TELEPHONE ENCOUNTER
PATIENT RETURNED CALL. SHE HAS BEEN NOTIFIED OF RESULTS AND SCHEDULED FOR PILL CAM ENDOSCOPY ON 10/27/23

## 2023-10-20 NOTE — TELEPHONE ENCOUNTER
Called patient to go over results and schedule pill cam endoscopy. Reached voicemail. Left detailed message for return call to discuss

## 2023-10-20 NOTE — TELEPHONE ENCOUNTER
----- Message from Lorelei Helm sent at 10/18/2023  9:41 AM EDT -----  Regarding: PILL CAM  Pill cam - Approved - no  date    ----- Message -----  From: Lucero Allen MA  Sent: 10/17/2023  12:00 PM EDT  To: Marlen East MA; Lorelei Helm      ----- Message -----  From: Preston Amos MD  Sent: 10/17/2023  11:58 AM EDT  To: [facility]    Let her know that one of her stool test shows some signs of inflammation  I have ordered a small bowel PillCam study to rule out Crohn's

## 2023-10-20 NOTE — TELEPHONE ENCOUNTER
----- Message from Marlen East MA sent at 10/20/2023  9:47 AM EDT -----  Lucero to relay message to patient.  ----- Message -----  From: Marlen East MA  Sent: 10/18/2023   2:26 PM EDT  To: Marlen East MA    Lm for patient to call on home #.   ----- Message -----  From: Lucero Allen MA  Sent: 10/17/2023  12:00 PM EDT  To: Marlen East MA; Lorelei Helm      ----- Message -----  From: Preston Amos MD  Sent: 10/17/2023  11:58 AM EDT  To: [facility]    Let her know that one of her stool test shows some signs of inflammation  I have ordered a small bowel PillCam study to rule out Crohn's

## 2023-10-23 ENCOUNTER — TELEPHONE (OUTPATIENT)
Dept: GASTROENTEROLOGY | Facility: CLINIC | Age: 63
End: 2023-10-23
Payer: MEDICAID

## 2023-10-23 NOTE — TELEPHONE ENCOUNTER
----- Message from Marlen East MA sent at 10/20/2023  1:05 PM EDT -----  The patient called back and I gave her the information about her test results, Lucero then explained the pillcam procedure and got her scheduled.  ----- Message -----  From: Marlen East MA  Sent: 10/20/2023   9:47 AM EDT  To: JASWINDER Chen to relay message to patient.  ----- Message -----  From: Marlen East MA  Sent: 10/18/2023   2:26 PM EDT  To: Marlen East MA     for patient to call on home #.   ----- Message -----  From: Lucero Allen MA  Sent: 10/17/2023  12:00 PM EDT  To: Marlen East MA; Lorelei Helm      ----- Message -----  From: Preston Amos MD  Sent: 10/17/2023  11:58 AM EDT  To: [facility]    Let her know that one of her stool test shows some signs of inflammation  I have ordered a small bowel PillCam study to rule out Crohn's

## 2023-11-02 ENCOUNTER — PROCEDURE VISIT (OUTPATIENT)
Dept: GASTROENTEROLOGY | Facility: CLINIC | Age: 63
End: 2023-11-02
Payer: MEDICAID

## 2023-11-02 VITALS — DIASTOLIC BLOOD PRESSURE: 60 MMHG | HEART RATE: 62 BPM | SYSTOLIC BLOOD PRESSURE: 102 MMHG | OXYGEN SATURATION: 97 %

## 2023-11-02 DIAGNOSIS — R19.5 ELEVATED FECAL CALPROTECTIN: Primary | ICD-10-CM

## 2023-11-13 DIAGNOSIS — K59.00 CONSTIPATION, UNSPECIFIED CONSTIPATION TYPE: ICD-10-CM

## 2023-11-13 DIAGNOSIS — R19.5 ELEVATED FECAL CALPROTECTIN: ICD-10-CM

## 2023-11-13 DIAGNOSIS — R10.84 GENERALIZED ABDOMINAL PAIN: ICD-10-CM

## 2023-12-04 ENCOUNTER — TELEPHONE (OUTPATIENT)
Dept: GASTROENTEROLOGY | Facility: CLINIC | Age: 63
End: 2023-12-04
Payer: MEDICAID

## 2023-12-04 NOTE — TELEPHONE ENCOUNTER
Hub staff attempted to follow warm transfer process and was unsuccessful     Caller: FREDDY CARRIER    Relationship to patient: SELF    Best call back number: [phone]    Patient is needing: PT WAS CALLING TO SEE IF RESULTS WERE BACK FROM HER SWALLOW TEST. CALLED KAPIL. KAPIL REQUESTED I SEND TE TO KAIT.

## 2023-12-15 ENCOUNTER — TELEPHONE (OUTPATIENT)
Dept: GASTROENTEROLOGY | Facility: CLINIC | Age: 63
End: 2023-12-15
Payer: MEDICAID

## 2023-12-15 DIAGNOSIS — K50.00 CROHN'S DISEASE OF SMALL INTESTINE WITHOUT COMPLICATION: Primary | ICD-10-CM

## 2023-12-15 DIAGNOSIS — R19.7 DIARRHEA, UNSPECIFIED TYPE: ICD-10-CM

## 2023-12-15 RX ORDER — BUDESONIDE 9 MG/1
9 TABLET, FILM COATED, EXTENDED RELEASE ORAL DAILY
Qty: 60 TABLET | Refills: 0 | Status: SHIPPED | OUTPATIENT
Start: 2023-12-15

## 2023-12-15 NOTE — TELEPHONE ENCOUNTER
I have sent budesonide to her pharmacy, will need to check on coverage.     She has lab and stool tests to complete asap.    Will plan to start Crohn's treatment after review of those labs.

## 2023-12-15 NOTE — TELEPHONE ENCOUNTER
----- Message from Preston Amos MD sent at 12/15/2023  2:55 PM EST -----  Get stool studies   Start her on budesonide 8 weeks  Also will work her up to start on a Biologics.  Will start with the anti-TNF and depending on insurance we can consider Steelisa sprague or Milli  Will treat her as Crohn's small bowel        ----- Message -----  From: Khushi Moreno PA-C  Sent: 12/14/2023   3:45 PM EST  To: Preston Amos MD      ----- Message -----  From: Marlen East MA  Sent: 12/14/2023   3:36 PM EST  To: Khushi Moreno PA-C    Diarrhea, has went to clear liquids, still having watery stools.  BM x 3-4, abd cramping all the time, nausea, would like to know if she can go ahead and start some medication for the inflammation shown on the pill cam, states she is now in a flare.

## 2023-12-29 ENCOUNTER — TELEPHONE (OUTPATIENT)
Dept: GASTROENTEROLOGY | Facility: CLINIC | Age: 63
End: 2023-12-29
Payer: MEDICAID

## 2023-12-29 DIAGNOSIS — K50.00 CROHN'S DISEASE OF SMALL INTESTINE WITHOUT COMPLICATION: Primary | ICD-10-CM

## 2023-12-29 RX ORDER — PREDNISONE 5 MG/1
TABLET ORAL
Qty: 109 TABLET | Refills: 0 | Status: SHIPPED | OUTPATIENT
Start: 2023-12-29

## 2024-03-16 NOTE — INTERVAL H&P NOTE
H&P reviewed. The patient was examined and there are no changes to the H&P.   PAST SURGICAL HISTORY:  No significant past surgical history

## 2024-05-16 ENCOUNTER — OFFICE VISIT (OUTPATIENT)
Dept: GASTROENTEROLOGY | Facility: CLINIC | Age: 64
End: 2024-05-16
Payer: MEDICAID

## 2024-05-16 ENCOUNTER — LAB (OUTPATIENT)
Dept: LAB | Facility: HOSPITAL | Age: 64
End: 2024-05-16
Payer: MEDICAID

## 2024-05-16 VITALS
HEART RATE: 76 BPM | TEMPERATURE: 97.5 F | WEIGHT: 234 LBS | DIASTOLIC BLOOD PRESSURE: 77 MMHG | BODY MASS INDEX: 36.73 KG/M2 | SYSTOLIC BLOOD PRESSURE: 130 MMHG | HEIGHT: 67 IN

## 2024-05-16 DIAGNOSIS — K50.00 CROHN'S DISEASE OF SMALL INTESTINE WITHOUT COMPLICATION: Primary | ICD-10-CM

## 2024-05-16 DIAGNOSIS — K50.00 CROHN'S DISEASE OF SMALL INTESTINE WITHOUT COMPLICATION: ICD-10-CM

## 2024-05-16 DIAGNOSIS — K21.9 GASTROESOPHAGEAL REFLUX DISEASE WITHOUT ESOPHAGITIS: ICD-10-CM

## 2024-05-16 LAB
ALBUMIN SERPL-MCNC: 4.2 G/DL (ref 3.5–5.2)
ALBUMIN/GLOB SERPL: 1.5 G/DL
ALP SERPL-CCNC: 93 U/L (ref 39–117)
ALT SERPL W P-5'-P-CCNC: 18 U/L (ref 1–33)
ANION GAP SERPL CALCULATED.3IONS-SCNC: 12.9 MMOL/L (ref 5–15)
AST SERPL-CCNC: 22 U/L (ref 1–32)
BASOPHILS # BLD AUTO: 0.03 10*3/MM3 (ref 0–0.2)
BASOPHILS NFR BLD AUTO: 0.3 % (ref 0–1.5)
BILIRUB SERPL-MCNC: 0.2 MG/DL (ref 0–1.2)
BUN SERPL-MCNC: 12 MG/DL (ref 8–23)
BUN/CREAT SERPL: 10.2 (ref 7–25)
CALCIUM SPEC-SCNC: 9.8 MG/DL (ref 8.6–10.5)
CHLORIDE SERPL-SCNC: 107 MMOL/L (ref 98–107)
CO2 SERPL-SCNC: 18.1 MMOL/L (ref 22–29)
CREAT SERPL-MCNC: 1.18 MG/DL (ref 0.57–1)
DEPRECATED RDW RBC AUTO: 44.9 FL (ref 37–54)
EGFRCR SERPLBLD CKD-EPI 2021: 52.3 ML/MIN/1.73
EOSINOPHIL # BLD AUTO: 0.44 10*3/MM3 (ref 0–0.4)
EOSINOPHIL NFR BLD AUTO: 4.5 % (ref 0.3–6.2)
ERYTHROCYTE [DISTWIDTH] IN BLOOD BY AUTOMATED COUNT: 14.1 % (ref 12.3–15.4)
GLOBULIN UR ELPH-MCNC: 2.8 GM/DL
GLUCOSE SERPL-MCNC: 122 MG/DL (ref 65–99)
HCT VFR BLD AUTO: 37.6 % (ref 34–46.6)
HGB BLD-MCNC: 12.7 G/DL (ref 12–15.9)
IMM GRANULOCYTES # BLD AUTO: 0.04 10*3/MM3 (ref 0–0.05)
IMM GRANULOCYTES NFR BLD AUTO: 0.4 % (ref 0–0.5)
LYMPHOCYTES # BLD AUTO: 2.84 10*3/MM3 (ref 0.7–3.1)
LYMPHOCYTES NFR BLD AUTO: 29.2 % (ref 19.6–45.3)
MCH RBC QN AUTO: 30.2 PG (ref 26.6–33)
MCHC RBC AUTO-ENTMCNC: 33.8 G/DL (ref 31.5–35.7)
MCV RBC AUTO: 89.3 FL (ref 79–97)
MONOCYTES # BLD AUTO: 0.77 10*3/MM3 (ref 0.1–0.9)
MONOCYTES NFR BLD AUTO: 7.9 % (ref 5–12)
NEUTROPHILS NFR BLD AUTO: 5.6 10*3/MM3 (ref 1.7–7)
NEUTROPHILS NFR BLD AUTO: 57.7 % (ref 42.7–76)
NRBC BLD AUTO-RTO: 0 /100 WBC (ref 0–0.2)
PLATELET # BLD AUTO: 223 10*3/MM3 (ref 140–450)
PMV BLD AUTO: 12.6 FL (ref 6–12)
POTASSIUM SERPL-SCNC: 4.3 MMOL/L (ref 3.5–5.2)
PROT SERPL-MCNC: 7 G/DL (ref 6–8.5)
RBC # BLD AUTO: 4.21 10*6/MM3 (ref 3.77–5.28)
SODIUM SERPL-SCNC: 138 MMOL/L (ref 136–145)
WBC NRBC COR # BLD AUTO: 9.72 10*3/MM3 (ref 3.4–10.8)

## 2024-05-16 PROCEDURE — 99214 OFFICE O/P EST MOD 30 MIN: CPT | Performed by: INTERNAL MEDICINE

## 2024-05-16 PROCEDURE — 1159F MED LIST DOCD IN RCRD: CPT | Performed by: INTERNAL MEDICINE

## 2024-05-16 PROCEDURE — 1160F RVW MEDS BY RX/DR IN RCRD: CPT | Performed by: INTERNAL MEDICINE

## 2024-05-16 PROCEDURE — 80053 COMPREHEN METABOLIC PANEL: CPT

## 2024-05-16 PROCEDURE — 36415 COLL VENOUS BLD VENIPUNCTURE: CPT

## 2024-05-16 PROCEDURE — 85025 COMPLETE CBC W/AUTO DIFF WBC: CPT

## 2024-05-16 RX ORDER — HYDROCODONE BITARTRATE AND ACETAMINOPHEN 5; 325 MG/1; MG/1
5 TABLET ORAL EVERY 6 HOURS PRN
COMMUNITY
Start: 2024-02-28

## 2024-05-16 RX ORDER — PANTOPRAZOLE SODIUM 40 MG/1
40 TABLET, DELAYED RELEASE ORAL DAILY
Qty: 90 TABLET | Refills: 1 | Status: SHIPPED | OUTPATIENT
Start: 2024-05-16

## 2024-05-16 RX ORDER — MESALAMINE 500 MG/1
1000 CAPSULE, EXTENDED RELEASE ORAL 3 TIMES DAILY
Qty: 180 CAPSULE | Refills: 5 | Status: SHIPPED | OUTPATIENT
Start: 2024-05-16

## 2024-05-16 RX ORDER — PANTOPRAZOLE SODIUM 20 MG/1
20 TABLET, DELAYED RELEASE ORAL DAILY
COMMUNITY
Start: 2024-02-16 | End: 2024-05-16 | Stop reason: SDUPTHER

## 2024-05-16 NOTE — PROGRESS NOTES
Follow Up Note     Date: 2024   Patient Name: Zulay Vega  MRN: [id number]  : 1960     Referring Physician: Jackelyn Ramey APRN    Chief Complaint:    Chief Complaint   Patient presents with    Abdominal Pain    Heartburn    Change in bowel habits       Interval History:   2024  Zulay Vega is a 64 y.o. female who is here today for follow up for her change in bowel habit, reflux symptoms.  She states that she lost about 12 pounds.  She tried to reduce the PPI which did not help her.  Lot of diets caused her significant symptoms including lactose bread cabbage beans and red meat.  She was treated with the course of prednisone for 6 weeks that helped her symptoms significantly.  She was mostly doing well until recently and again flared up.      2023   Zulay Vega is a 63 y.o. female who is here today to establish care with Gastroenterology for evaluation for ongoing abdominal pain change in bowel habit. Patient states that she has been having issues with the significant abdominal bloating, abdominal distention, right-sided lower abdominal pain over a year now which is not improving.     Her pain is mostly on the right side abdomen and also sometimes suprapubic region sometimes pain radiates to her back.  Pain intermittent present most days.  She feels bloated mostly on the right side abdomen.  She has been having bowel movement 2 to 3/day which very different in shape and sometimes floats.  Denies any blood in the stool.  No constipation as such.  Occasional nausea associated but no vomiting.  She is a chronic smoker and her dietary habit is also not healthy and drinks multiple cans of carbonated beverages at least 4 to 5 20 once beverages /day.  As per patient she also gained over 100 pounds since last 1 to 2 years.     She also has a history of gastroesophageal reflux disease and is well controlled with PPI daily that has been started by PCP in 2023.  She  states that she had a blood work done which was positive for H. pylori for which he was given a triple therapy.  To note that H. pylori breath test was negative before the antibiotic therapy. No prior EGD or colonoscopy.  She is nonalcoholic.  No history of anemia.  No family history of colon cancer or GI malignancy.  Her lab work done in March 2023 revealed a normal CBC with a hemoglobin of 14 and normal CMP.  Celiac serology was negative.    Subjective      Past Medical History:   Past Medical History:   Diagnosis Date    Angina pectoris 06/29/2021    Anxiety and depression     Arthritis     Asystole 1993    coded during a cardiac ablation    AV yesenia tachycardia     with svt    Blurred vision     Chest pain 11/2022    was seen at [facility] that her heart checked out ok.    Dizziness     GERD (gastroesophageal reflux disease)     Heart disease     Hypotension     orthostatic    Hypothyroidism     Migraines     Mitral valve disease 06/29/2021    MRSA (methicillin resistant staph aureus) culture positive 2016    ARMS    Osteoporosis 06/29/2021    Pain in joint involving multiple sites     Poor historian 06/29/2021    POTS (postural orthostatic tachycardia syndrome)     Seizures 2016    states were stress induced and then went away    Snores     Wears dentures     upper plate only     Past Surgical History:   Past Surgical History:   Procedure Laterality Date    CARDIAC ABLATION      1993    COLONOSCOPY N/A 07/28/2023    Procedure: COLONOSCOPY WITH BIOPSY AND POLYPECTOMY;  Surgeon: Preston Amos MD;  Location: [facility] ENDOSCOPY;  Service: Gastroenterology;  Laterality: N/A;    ENDOSCOPY N/A 07/28/2023    Procedure: ESOPHAGOGASTRODUODENOSCOPY WITH BIOPSY;  Surgeon: Preston Amos MD;  Location: [facility] ENDOSCOPY;  Service: Gastroenterology;  Laterality: N/A;    HIP CANNULATED SCREW PLACEMENT Right 07/01/2021    Procedure: CANNULATED SCREWS FIXATION OF RIGHT HIP FRACTURE;  Surgeon: Ady Gomez,  MD;  Location: [facility];  Service: Orthopedics;  Laterality: Right;    MOUTH SURGERY N/A     teeth extraction, ready for implants    TONSILLECTOMY Bilateral     VAGINAL DELIVERY N/A     VAGINAL HYSTERECTOMY SALPINGO OOPHORECTOMY Bilateral     WISDOM TOOTH EXTRACTION         Family History:   Family History   Problem Relation Age of Onset    Cancer Mother     Throat cancer Mother     Diabetes Father     Heart disease Father     Suicidality Brother     Cancer Paternal Aunt     Pancreatic cancer Paternal Aunt     Leukemia Son     Hypertension Sister     Cancer Maternal Aunt     Breast cancer Maternal Aunt     Colon cancer Neg Hx     Cirrhosis Neg Hx     Liver cancer Neg Hx     Liver disease Neg Hx     Stomach cancer Neg Hx     Esophageal cancer Neg Hx        Social History:   Social History     Socioeconomic History    Marital status:    Tobacco Use    Smoking status: Every Day     Current packs/day: 0.50     Average packs/day: 0.5 packs/day for 48.4 years (24.2 ttl pk-yrs)     Types: Cigarettes     Start date: 1976    Smokeless tobacco: Never   Vaping Use    Vaping status: Former    Substances: Nicotine, Flavoring    Devices: Disposable   Substance and Sexual Activity    Alcohol use: Never    Drug use: Never    Sexual activity: Defer       Medications:     Current Outpatient Medications:     ACETAMINOPHEN PO, Take 1,000 mg by mouth Every 6 (Six) Hours As Needed., Disp: , Rfl:     atorvastatin (LIPITOR) 80 MG tablet, Take 1 tablet by mouth Daily., Disp: , Rfl:     cyanocobalamin 1000 MCG/ML injection, Inject 1 mL into the appropriate muscle as directed by prescriber Every 30 (Thirty) Days., Disp: , Rfl:     estradiol (MINIVELLE, VIVELLE-DOT) 0.075 MG/24HR patch, Place 1 patch on the skin as directed by provider 2 (Two) Times a Week., Disp: , Rfl:     Famotidine (PEPCID PO), Take  by mouth Daily As Needed., Disp: , Rfl:     HYDROcodone-acetaminophen (NORCO) 5-325 MG per tablet, Take 5 mg of hydrocodone by mouth  "Every 6 (Six) Hours As Needed., Disp: , Rfl:     IBUPROFEN PO, Take  by mouth As Needed., Disp: , Rfl:     metaxalone (SKELAXIN) 800 MG tablet, Take 1 tablet by mouth 3 (Three) Times a Day., Disp: , Rfl:     midodrine (PROAMATINE) 5 MG tablet, Take 1 tablet by mouth 3 (Three) Times a Day., Disp: 90 tablet, Rfl: 0    pantoprazole (PROTONIX) 20 MG EC tablet, Take 1 tablet by mouth Daily., Disp: , Rfl:     pindolol (VISKEN) 5 MG tablet, Take 1 tablet by mouth 2 (Two) Times a Day., Disp: 60 tablet, Rfl: 0    pregabalin (LYRICA) 150 MG capsule, Take 1 capsule by mouth 3 (Three) Times a Day., Disp: , Rfl:     Allergies:   Allergies   Allergen Reactions    Isuprel [Isoproterenol] Anaphylaxis     States that this caused her to code during a cardiac ablation.      Nsaids Rash     Patient states she can tolerate now without issue.     Wellbutrin [Bupropion] Dizziness       Review of Systems:   Review of Systems   Constitutional:  Positive for fatigue. Negative for appetite change, fever and unexpected weight loss.   HENT:  Negative for trouble swallowing.    Gastrointestinal:  Positive for abdominal pain, nausea and rectal pain. Negative for abdominal distention, anal bleeding, blood in stool, constipation, diarrhea, vomiting, GERD and indigestion.       The following portions of the patient's history were reviewed and updated as appropriate: allergies, current medications, past family history, past medical history, past social history, past surgical history and problem list.    Objective     Physical Exam:  Vital Signs:   Vitals:    05/16/24 1441   BP: 130/77   Pulse: 76   Temp: 97.5 °F (36.4 °C)   TempSrc: Infrared   Weight: 106 kg (234 lb)  Comment: with clothing/shoes   Height: 170.2 cm (67\")  Comment: per EPIC       Physical Exam  Constitutional:       Appearance: She is obese.   HENT:      Head: Normocephalic and atraumatic.   Eyes:      Conjunctiva/sclera: Conjunctivae normal.   Abdominal:      General: Abdomen is " flat. There is no distension.      Palpations: There is no mass.      Tenderness: There is no abdominal tenderness. There is no guarding or rebound.      Hernia: No hernia is present.   Musculoskeletal:      Cervical back: Normal range of motion and neck supple.   Neurological:      Mental Status: She is alert.         Results Review:   I reviewed the patient's new clinical results.    No visits with results within 90 Day(s) from this visit.   Latest known visit with results is:   Lab on 09/20/2023   Component Date Value Ref Range Status    Tissue Transglutaminase IgA 09/20/2023 <2  0 - 3 U/mL Final                                  Negative        0 -  3                                Weak Positive   4 - 10                                Positive           >10   Tissue Transglutaminase (tTG) has been identified   as the endomysial antigen.  Studies have demonstr-   ated that endomysial IgA antibodies have over 99%   specificity for gluten sensitive enteropathy.    WBC 09/20/2023 10.24  3.40 - 10.80 10*3/mm3 Final    RBC 09/20/2023 4.29  3.77 - 5.28 10*6/mm3 Final    Hemoglobin 09/20/2023 13.1  12.0 - 15.9 g/dL Final    Hematocrit 09/20/2023 38.8  34.0 - 46.6 % Final    MCV 09/20/2023 90.4  79.0 - 97.0 fL Final    MCH 09/20/2023 30.5  26.6 - 33.0 pg Final    MCHC 09/20/2023 33.8  31.5 - 35.7 g/dL Final    RDW 09/20/2023 13.2  12.3 - 15.4 % Final    RDW-SD 09/20/2023 43.8  37.0 - 54.0 fl Final    MPV 09/20/2023 12.5 (H)  6.0 - 12.0 fL Final    Platelets 09/20/2023 216  140 - 450 10*3/mm3 Final    Neutrophil % 09/20/2023 58.8  42.7 - 76.0 % Final    Lymphocyte % 09/20/2023 28.0  19.6 - 45.3 % Final    Monocyte % 09/20/2023 8.5  5.0 - 12.0 % Final    Eosinophil % 09/20/2023 3.8  0.3 - 6.2 % Final    Basophil % 09/20/2023 0.5  0.0 - 1.5 % Final    Immature Grans % 09/20/2023 0.4  0.0 - 0.5 % Final    Neutrophils, Absolute 09/20/2023 6.02  1.70 - 7.00 10*3/mm3 Final    Lymphocytes, Absolute 09/20/2023 2.87  0.70 - 3.10  10*3/mm3 Final    Monocytes, Absolute 09/20/2023 0.87  0.10 - 0.90 10*3/mm3 Final    Eosinophils, Absolute 09/20/2023 0.39  0.00 - 0.40 10*3/mm3 Final    Basophils, Absolute 09/20/2023 0.05  0.00 - 0.20 10*3/mm3 Final    Immature Grans, Absolute 09/20/2023 0.04  0.00 - 0.05 10*3/mm3 Final    nRBC 09/20/2023 0.0  0.0 - 0.2 /100 WBC Final    Glucose 09/20/2023 85  65 - 99 mg/dL Final    BUN 09/20/2023 15  8 - 23 mg/dL Final    Creatinine 09/20/2023 1.13 (H)  0.57 - 1.00 mg/dL Final    Sodium 09/20/2023 139  136 - 145 mmol/L Final    Potassium 09/20/2023 4.7  3.5 - 5.2 mmol/L Final    Slight hemolysis detected by analyzer. Results may be affected.    Chloride 09/20/2023 106  98 - 107 mmol/L Final    CO2 09/20/2023 18.2 (L)  22.0 - 29.0 mmol/L Final    Calcium 09/20/2023 9.6  8.6 - 10.5 mg/dL Final    Total Protein 09/20/2023 7.6  6.0 - 8.5 g/dL Final    Albumin 09/20/2023 4.4  3.5 - 5.2 g/dL Final    ALT (SGPT) 09/20/2023 17  1 - 33 U/L Final    AST (SGOT) 09/20/2023 25  1 - 32 U/L Final    Alkaline Phosphatase 09/20/2023 105  39 - 117 U/L Final    Total Bilirubin 09/20/2023 0.2  0.0 - 1.2 mg/dL Final    Globulin 09/20/2023 3.2  gm/dL Final    A/G Ratio 09/20/2023 1.4  g/dL Final    BUN/Creatinine Ratio 09/20/2023 13.3  7.0 - 25.0 Final    Anion Gap 09/20/2023 14.8  5.0 - 15.0 mmol/L Final    eGFR 09/20/2023 55.5 (L)  >60.0 mL/min/1.73 Final    IgA 09/20/2023 185  70 - 400 mg/dL Final    Calprotectin, Fecal 10/05/2023 122 (H)  0 - 120 ug/g Final    Concentration     Interpretation   Follow-Up  < 5 - 50 ug/g     Normal           None  >50 -120 ug/g     Borderline       Re-evaluate in 4-6 weeks      >120 ug/g     Abnormal         Repeat as clinically                                     indicated    Pancreatic Fecal 10/05/2023 452  >200 ug Elast./g Final           Severe Pancreatic Insufficiency:          <100         Moderate Pancreatic Insufficiency:   100 - 200         Normal:                                   >200     H pylori Ag, Stl 10/05/2023 Negative  Negative Final      No radiology results for the last 90 days.      EGD 7/28/2023  - Normal oropharynx.  - Z-line regular, 33 cm from the incisors.  - 2 cm hiatal hernia.  - Distal Tortuous esophagus.  - No gross lesions in the entire esophagus.  - Mild healing Erosive gastropathy with no stigmata of recent bleeding. Biopsied.  - Normal duodenal bulb, first portion of the duodenum, second portion of the duodenum and third portion of the duodenum     Colon 7/28/23  - Decreased sphincter tone found on digital rectal exam.  - Few small polyps at the recto-sigmoid colon,two removed with a cold snare. Resected and retrieved. Clinically hyperplastic  - Diverticulosis in the sigmoid colon.  - The examined portion of the ileum was normal. Biopsied.  - Tortuous colon.  - Non-bleeding external and internal hemorrhoids.  - Biopsies were taken with a cold forceps for histology in the descending colon, in the transverse colon and in the ascending colon.        Pathology  DIAGNOSIS:  A. DUODENUM, BIOPSY:  Duodenal type mucosa with no significant histopathologic abnormalities  B. ANTRUM AND BODY, BIOPSY:  Gastric antral type mucosa with mild chronic inactive gastritis  Negative for intestinal metaplasia or dysplasia  No Helicobacter pylori-like organisms seen  C. TERMINAL ILEUM BIOPSY:  Small intestinal mucosa with no significant histopathologic abnormalities  D. RANDOM COLON BIOPSIES, ASCENDING, TRANSVERSE, AND DESCENDING:  Colonic type mucosa with no significant histopathologic abnormalities  E. RECTOSIGMOID COLON, POLYP, X2:  Hyperplastic polyps    PillCam study on 11/13/2023  Multiple areas of healed inflammation, erosion and ulceration scars throughout the small bowel noted with couple patchy areas of superficial inflammation identified in the ileum.  No deep ulceration or active inflammation otherwise    Assessment / Plan      1.  Right-sided abdominal pain /diarrhea  2.  Quiescent  Crohn's disease small bowel suspected  3.  Irritable bowel syndrome with diarrhea  5/16/2024  PillCam study showed a multiple areas of healed inflammation erosion ulceration scars throughout the small bowel without any significant active inflammation or ulcerations.  This may suggest quiescent Crohn's.  Her celiac serology was negative.  Stool study done on 10/5/2023 revealed borderline elevated fecal calprotectin of 122.  Fecal elastase was normal 452..  CT abdomen pelvis done on 10/5/2023 did not reveal any significant abnormality except hiatal hernia and fatty liver disease.  Patient had a good symptom relief with the tapering dose of steroids.  Given her persistent symptoms and response to steroids we will try a trial of Pentasa and assess for response    Pentasa 1 g p.o. 3 times daily  Dietary changes discussed  Will follow-up in 3 months with lab work    9/20/2023  EGD colonoscopy done on 7/28/2023 did not reveal any endoscopic signs that could explain her symptoms.  Duodenal biopsies were normal.  Random colon and TI biopsies were normal too.    Ultrasound abdomen done in May 2023 revealed only showed fatty liver disease without any liver lesions. Her dietary habit is not healthy.  She is also a chronic smoker and both these significantly contributing to her symptoms     4. Gastroesophageal reflux disease without esophagitis  She could not continue with the PPI as needed as her symptoms recurred right back after 24 hours and had to be started back on Protonix 40 mg p.o. daily.  She does not want to reduce the dose.  EGD 7/28/2023 no Jacques's  Will continue current care     Prior history  5. MASLD   Fib 4; 1.74 -less likely for cirrhosis  Ultrasound done in May 2023 revealed fatty liver disease without any liver lesions. Lifestyle changes, dietary changes discussed to lose weight to prevent the progression of the liver disease     6. Encounter for screening for malignant neoplasm of colon  Colonoscopy on  07/20/2023 revealed only hyperplastic polyp..  Repeat colonoscopy in 10 years time in 2033 or earlier       Follow Up:   No follow-ups on file.    Preston Amos MD  [facility] [place]  5/16/2024  14:44 EDT     Please note that portions of this note may have been completed with a voice recognition program.

## 2024-11-14 ENCOUNTER — OFFICE VISIT (OUTPATIENT)
Dept: GASTROENTEROLOGY | Facility: CLINIC | Age: 64
End: 2024-11-14
Payer: MEDICAID

## 2024-11-14 ENCOUNTER — LAB (OUTPATIENT)
Dept: LAB | Facility: HOSPITAL | Age: 64
End: 2024-11-14
Payer: MEDICAID

## 2024-11-14 VITALS
DIASTOLIC BLOOD PRESSURE: 72 MMHG | WEIGHT: 214 LBS | SYSTOLIC BLOOD PRESSURE: 122 MMHG | HEART RATE: 65 BPM | OXYGEN SATURATION: 96 % | BODY MASS INDEX: 33.52 KG/M2

## 2024-11-14 DIAGNOSIS — K50.00 CROHN'S DISEASE OF SMALL INTESTINE WITHOUT COMPLICATION: Primary | ICD-10-CM

## 2024-11-14 DIAGNOSIS — R19.7 DIARRHEA, UNSPECIFIED TYPE: ICD-10-CM

## 2024-11-14 DIAGNOSIS — K50.00 CROHN'S DISEASE OF SMALL INTESTINE WITHOUT COMPLICATION: ICD-10-CM

## 2024-11-14 DIAGNOSIS — K76.0 METABOLIC DYSFUNCTION-ASSOCIATED STEATOTIC LIVER DISEASE (MASLD): ICD-10-CM

## 2024-11-14 DIAGNOSIS — R10.31 RLQ ABDOMINAL PAIN: ICD-10-CM

## 2024-11-14 LAB
ALBUMIN SERPL-MCNC: 4.1 G/DL (ref 3.5–5.2)
ALBUMIN/GLOB SERPL: 1.3 G/DL
ALP SERPL-CCNC: 83 U/L (ref 39–117)
ALT SERPL W P-5'-P-CCNC: 29 U/L (ref 1–33)
ANION GAP SERPL CALCULATED.3IONS-SCNC: 11.1 MMOL/L (ref 5–15)
AST SERPL-CCNC: 34 U/L (ref 1–32)
BILIRUB SERPL-MCNC: 0.3 MG/DL (ref 0–1.2)
BUN SERPL-MCNC: 11 MG/DL (ref 8–23)
BUN/CREAT SERPL: 8.7 (ref 7–25)
CALCIUM SPEC-SCNC: 10.1 MG/DL (ref 8.6–10.5)
CHLORIDE SERPL-SCNC: 108 MMOL/L (ref 98–107)
CO2 SERPL-SCNC: 19.9 MMOL/L (ref 22–29)
CREAT SERPL-MCNC: 1.27 MG/DL (ref 0.57–1)
CRP SERPL-MCNC: 0.39 MG/DL (ref 0–0.5)
EGFRCR SERPLBLD CKD-EPI 2021: 47.9 ML/MIN/1.73
FOLATE SERPL-MCNC: 12.9 NG/ML (ref 4.78–24.2)
GLOBULIN UR ELPH-MCNC: 3.2 GM/DL
GLUCOSE SERPL-MCNC: 89 MG/DL (ref 65–99)
HBV SURFACE AB SER RIA-ACNC: NORMAL
HBV SURFACE AG SERPL QL IA: NORMAL
HCV AB SER QL: NORMAL
IRON 24H UR-MRATE: 109 MCG/DL (ref 37–145)
IRON SATN MFR SERPL: 31 % (ref 20–50)
POTASSIUM SERPL-SCNC: 4.4 MMOL/L (ref 3.5–5.2)
PROT SERPL-MCNC: 7.3 G/DL (ref 6–8.5)
SODIUM SERPL-SCNC: 139 MMOL/L (ref 136–145)
TIBC SERPL-MCNC: 353 MCG/DL (ref 298–536)
TRANSFERRIN SERPL-MCNC: 237 MG/DL (ref 200–360)
VIT B12 BLD-MCNC: 685 PG/ML (ref 211–946)

## 2024-11-14 PROCEDURE — 82728 ASSAY OF FERRITIN: CPT

## 2024-11-14 PROCEDURE — 86140 C-REACTIVE PROTEIN: CPT

## 2024-11-14 PROCEDURE — 36415 COLL VENOUS BLD VENIPUNCTURE: CPT

## 2024-11-14 PROCEDURE — 86704 HEP B CORE ANTIBODY TOTAL: CPT

## 2024-11-14 PROCEDURE — 86706 HEP B SURFACE ANTIBODY: CPT

## 2024-11-14 PROCEDURE — 85027 COMPLETE CBC AUTOMATED: CPT

## 2024-11-14 PROCEDURE — 1160F RVW MEDS BY RX/DR IN RCRD: CPT | Performed by: PHYSICIAN ASSISTANT

## 2024-11-14 PROCEDURE — 82652 VIT D 1 25-DIHYDROXY: CPT

## 2024-11-14 PROCEDURE — 86803 HEPATITIS C AB TEST: CPT

## 2024-11-14 PROCEDURE — 87340 HEPATITIS B SURFACE AG IA: CPT

## 2024-11-14 PROCEDURE — 81479 UNLISTED MOLECULAR PATHOLOGY: CPT

## 2024-11-14 PROCEDURE — 83540 ASSAY OF IRON: CPT

## 2024-11-14 PROCEDURE — 82746 ASSAY OF FOLIC ACID SERUM: CPT

## 2024-11-14 PROCEDURE — 86708 HEPATITIS A ANTIBODY: CPT

## 2024-11-14 PROCEDURE — 84466 ASSAY OF TRANSFERRIN: CPT

## 2024-11-14 PROCEDURE — 81335 TPMT GENE COM VARIANTS: CPT

## 2024-11-14 PROCEDURE — 1159F MED LIST DOCD IN RCRD: CPT | Performed by: PHYSICIAN ASSISTANT

## 2024-11-14 PROCEDURE — 82607 VITAMIN B-12: CPT

## 2024-11-14 PROCEDURE — 99214 OFFICE O/P EST MOD 30 MIN: CPT | Performed by: PHYSICIAN ASSISTANT

## 2024-11-14 PROCEDURE — 80053 COMPREHEN METABOLIC PANEL: CPT

## 2024-11-14 PROCEDURE — 86480 TB TEST CELL IMMUN MEASURE: CPT

## 2024-11-14 PROCEDURE — 81306 NUDT15 GENE COMMON VARIANTS: CPT

## 2024-11-14 RX ORDER — FERROUS SULFATE 324(65)MG
324 TABLET, DELAYED RELEASE (ENTERIC COATED) ORAL DAILY
COMMUNITY
Start: 2024-09-13

## 2024-11-14 RX ORDER — TRAMADOL HYDROCHLORIDE 50 MG/1
50 TABLET ORAL 3 TIMES DAILY
COMMUNITY

## 2024-11-14 RX ORDER — MESALAMINE 500 MG/1
1000 CAPSULE, EXTENDED RELEASE ORAL 3 TIMES DAILY
Qty: 180 CAPSULE | Refills: 5 | Status: SHIPPED | OUTPATIENT
Start: 2024-11-14

## 2024-11-14 RX ORDER — LANOLIN ALCOHOL/MO/W.PET/CERES
400 CREAM (GRAM) TOPICAL DAILY
COMMUNITY
Start: 2024-09-13

## 2024-11-14 RX ORDER — ATORVASTATIN CALCIUM 40 MG/1
40 TABLET, FILM COATED ORAL DAILY
COMMUNITY

## 2024-11-14 RX ORDER — ESTRADIOL 0.07 MG/D
PATCH TRANSDERMAL
COMMUNITY
Start: 2024-11-06

## 2024-11-14 NOTE — PROGRESS NOTES
Follow Up Note     Date: 2024   Patient Name: Zulay Vega  MRN: [id number]  : 1960     Primary Care Provider: Jackelyn Ramey APRN     Chief Complaint   Patient presents with    Crohn's Disease     History of present illness:   2024  Zulay Vega is a 64 y.o. female who is here today for follow up regarding Crohn's Disease.    Now having 3-4 stools per day, sometimes loose, mostly soft. At times, has irritation in her anal region sometimes with sporadic fecal smearing. Now on Pentasa 2 pills 3 times daily, feels that her symptoms have improved some from . Was previously having very severe watery diarrhea, numerous stools per day. Still with abdominal pain located in RLQ, localized bloating, present after eating and occurring daily. Feels that her RLQ is heavy in this area when this happens. Has been avoiding gluten and dairy because these seemed to make all of her symptoms worse. She has to be very strict with her diet in order to feel okay.     She was taking Goody's powder often previously but stopped, still takes oral ibuprofen often even now. Stopped pop, except has 1 a day. She has cut back on smoking, almost stopped now per her report. Has lost 20 lbs intentionally over the past few months.     Interval History:  2023  Zulay Vega is a 63 y.o. female who is here today to establish care with Gastroenterology for evaluation for ongoing abdominal pain change in bowel habit. Patient states that she has been having issues with the significant abdominal bloating, abdominal distention, right-sided lower abdominal pain over a year now which is not improving.     Her pain is mostly on the right side abdomen and also sometimes suprapubic region sometimes pain radiates to her back.  Pain intermittent present most days.  She feels bloated mostly on the right side abdomen.  She has been having bowel movement 2 to 3/day which very different in shape and sometimes  floats.  Denies any blood in the stool.  No constipation as such.  Occasional nausea associated but no vomiting.  She is a chronic smoker and her dietary habit is also not healthy and drinks multiple cans of carbonated beverages at least 4 to 5 20 once beverages /day.  As per patient she also gained over 100 pounds since last 1 to 2 years.     She also has a history of gastroesophageal reflux disease and is well controlled with PPI daily that has been started by PCP in January 2023.  She states that she had a blood work done which was positive for H. pylori for which he was given a triple therapy.  To note that H. pylori breath test was negative before the antibiotic therapy. No prior EGD or colonoscopy.  She is nonalcoholic.  No history of anemia.  No family history of colon cancer or GI malignancy.  Her lab work done in March 2023 revealed a normal CBC with a hemoglobin of 14 and normal CMP.  Celiac serology was negative.    Subjective     Past Medical History:   Diagnosis Date    Angina pectoris 06/29/2021    Anxiety and depression     Arthritis     Asystole 1993    coded during a cardiac ablation    AV yesenia tachycardia     with svt    Blurred vision     C[facility] pain [facility]2022    [facility]    Dizziness     GERD (gastroesophageal reflux disease)     Heart disease     Hypotension     orthostatic    Hypothyroidism     Migraines     Mitral valve disease 06/29/2021    MRSA (methicillin resistant staph aureus) culture positive 2016    [facility]    Osteoporosis 06/29/2021    Pain in joint involving multiple sites     Poor historian 06/29/2021    POTS (postural orthostatic tachycardia syndrome)     Seizures 2016    states were stress induced and then went away    Snores     Wears dentures     upper plate only     Past Surgical History:   Procedure Laterality Date    CARDIAC ABLATION      1993    COLONOSCOPY N/A 07/28/2023    Procedure: COLONOSCOPY WITH BIOPSY AND POLYPECTOMY;  Surgeon:  Preston Amos MD;  Location: [facility] ENDOSCOPY;  Service: Gastroenterology;  Laterality: N/A;    ENDOSCOPY N/A 07/28/2023    Procedure: ESOPHAGOGASTRODUODENOSCOPY WITH BIOPSY;  Surgeon: Preston Amos MD;  Location: [facility] ENDOSCOPY;  Service: Gastroenterology;  Laterality: N/A;    HIP CANNULATED SCREW PLACEMENT Right 07/01/2021    Procedure: CANNULATED SCREWS FIXATION OF RIGHT HIP FRACTURE;  Surgeon: Ady Gomez MD;  Location: [facility] OR;  Service: Orthopedics;  Laterality: Right;    MOUTH SURGERY N/A     teeth extraction, ready for implants    TONSILLECTOMY Bilateral     VAGINAL DELIVERY N/A     VAGINAL HYSTERECTOMY SALPINGO OOPHORECTOMY Bilateral     WISDOM TOOTH EXTRACTION       Family History   Problem Relation Age of Onset    Cancer Mother     Throat cancer Mother     Diabetes Father     Heart disease Father     Suicidality Brother     Cancer Paternal Aunt     Pancreatic cancer Paternal Aunt     Leukemia Son     Hypertension Sister     Cancer Maternal Aunt     Breast cancer Maternal Aunt     Colon cancer Neg Hx     Cirrhosis Neg Hx     Liver cancer Neg Hx     Liver disease Neg Hx     Stomach cancer Neg Hx     Esophageal cancer Neg Hx      Social History     Socioeconomic History    Marital status:    Tobacco Use    Smoking status: Every Day     Current packs/day: 0.50     Average packs/day: 0.5 packs/day for 48.9 years (24.4 ttl pk-yrs)     Types: Cigarettes     Start date: 1976    Smokeless tobacco: Never   Vaping Use    Vaping status: Former    Substances: Nicotine, Flavoring    Devices: Disposable   Substance and Sexual Activity    Alcohol use: Never    Drug use: Never    Sexual activity: Defer     Current Outpatient Medications:     ACETAMINOPHEN PO, Take 1,000 mg by mouth Every 6 (Six) Hours As Needed., Disp: , Rfl:     atorvastatin (LIPITOR) 40 MG tablet, Take 1 tablet by mouth Daily., Disp: , Rfl:     Cholecalciferol (Vitamin D3) 25 MCG (1000 UT) capsule, Take 1 capsule by  mouth Daily., Disp: , Rfl:     cyanocobalamin 1000 MCG/ML injection, Inject 1 mL into the appropriate muscle as directed by prescriber Every 30 (Thirty) Days., Disp: , Rfl:     estradiol (CLIMARA) 0.075 MG/24HR patch, APPLY 1 PATCH TOPICALLY TO THE SKIN 2 TIMES WEEKLY, Disp: , Rfl:     Famotidine (PEPCID PO), Take  by mouth Daily As Needed., Disp: , Rfl:     ferrous sulfate 324 (65 Fe) MG tablet delayed-release EC tablet, Take 1 tablet by mouth Daily., Disp: , Rfl:     folic acid (FOLVITE) 400 MCG tablet, Take 1 tablet by mouth Daily., Disp: , Rfl:     IBUPROFEN PO, Take  by mouth As Needed., Disp: , Rfl:     mesalamine (PENTASA) 500 MG CR capsule, Take 2 capsules by mouth 3 (Three) Times a Day., Disp: 180 capsule, Rfl: 5    metaxalone (SKELAXIN) 800 MG tablet, Take 1 tablet by mouth 3 (Three) Times a Day., Disp: , Rfl:     midodrine (PROAMATINE) 5 MG tablet, Take 1 tablet by mouth 3 (Three) Times a Day., Disp: 90 tablet, Rfl: 0    pantoprazole (PROTONIX) 40 MG EC tablet, Take 1 tablet by mouth Daily., Disp: 90 tablet, Rfl: 1    pindolol (VISKEN) 5 MG tablet, Take 1 tablet by mouth 2 (Two) Times a Day., Disp: 60 tablet, Rfl: 0    pregabalin (LYRICA) 150 MG capsule, Take 1 capsule by mouth 3 (Three) Times a Day., Disp: , Rfl:     traMADol (ULTRAM) 50 MG tablet, Take 1 tablet by mouth 3 (Three) Times a Day., Disp: , Rfl:     Allergies   Allergen Reactions    Isuprel [Isoproterenol] Anaphylaxis     States that this caused her to code during a cardiac ablation.      Nsaids Rash     Patient states she can tolerate now without issue.     Wellbutrin [Bupropion] Dizziness     The following portions of the patient's history were reviewed and updated as appropriate: allergies, current medications, past family history, past medical history, past social history, past surgical history and problem list.    Objective     Physical Exam  Constitutional:       General: She is not in acute distress.     Appearance: Normal appearance.  She is well-developed. She is not diaphoretic.   HENT:      Head: Normocephalic and atraumatic.      Right Ear: External ear normal.      Left Ear: External ear normal.      Nose: Nose normal.   Eyes:      General: No scleral icterus.        Right eye: No discharge.         Left eye: No discharge.      Conjunctiva/sclera: Conjunctivae normal.   Neck:      Trachea: No tracheal deviation.   Pulmonary:      Effort: Pulmonary effort is normal. No respiratory distress.   Musculoskeletal:         General: Normal range of motion.      Cervical back: Normal range of motion.   Skin:     Coloration: Skin is not pale.      Findings: No erythema or rash.   Neurological:      Mental Status: She is alert and oriented to person, place, and time.      Coordination: Coordination normal.   Psychiatric:         Mood and Affect: Mood normal.         Behavior: Behavior normal.         Thought Content: Thought content normal.         Judgment: Judgment normal.       Vitals:    11/14/24 1448   BP: 122/72   Pulse: 65   SpO2: 96%   Weight: 97.1 kg (214 lb)     Results Review:   I reviewed the patient's new clinical results.    Lab on 05/16/2024   Component Date Value Ref Range Status    Glucose 05/16/2024 122 (H)  65 - 99 mg/dL Final    BUN 05/16/2024 12  8 - 23 mg/dL Final    Creatinine 05/16/2024 1.18 (H)  0.57 - 1.00 mg/dL Final    Sodium 05/16/2024 138  136 - 145 mmol/L Final    Potassium 05/16/2024 4.3  3.5 - 5.2 mmol/L Final    Chloride 05/16/2024 107  98 - 107 mmol/L Final    CO2 05/16/2024 18.1 (L)  22.0 - 29.0 mmol/L Final    Calcium 05/16/2024 9.8  8.6 - 10.5 mg/dL Final    Total Protein 05/16/2024 7.0  6.0 - 8.5 g/dL Final    Albumin 05/16/2024 4.2  3.5 - 5.2 g/dL Final    ALT (SGPT) 05/16/2024 18  1 - 33 U/L Final    AST (SGOT) 05/16/2024 22  1 - 32 U/L Final    Alkaline Phosphatase 05/16/2024 93  39 - 117 U/L Final    Total Bilirubin 05/16/2024 0.2  0.0 - 1.2 mg/dL Final    Globulin 05/16/2024 2.8  gm/dL Final    A/G Ratio  05/16/2024 1.5  g/dL Final    BUN/Creatinine Ratio 05/16/2024 10.2  7.0 - 25.0 Final    Anion Gap 05/16/2024 12.9  5.0 - 15.0 mmol/L Final    eGFR 05/16/2024 52.3 (L)  >60.0 mL/min/1.73 Final    WBC 05/16/2024 9.72  3.40 - 10.80 10*3/mm3 Final    RBC 05/16/2024 4.21  3.77 - 5.28 10*6/mm3 Final    Hemoglobin 05/16/2024 12.7  12.0 - 15.9 g/dL Final    Hematocrit 05/16/2024 37.6  34.0 - 46.6 % Final    MCV 05/16/2024 89.3  79.0 - 97.0 fL Final    MCH 05/16/2024 30.2  26.6 - 33.0 pg Final    MCHC 05/16/2024 33.8  31.5 - 35.7 g/dL Final    RDW 05/16/2024 14.1  12.3 - 15.4 % Final    RDW-SD 05/16/2024 44.9  37.0 - 54.0 fl Final    MPV 05/16/2024 12.6 (H)  6.0 - 12.0 fL Final    Platelets 05/16/2024 223  140 - 450 10*3/mm3 Final      EGD and colonoscopy 7/28/23  - Normal oropharynx.  - Z-line regular, 33 cm from the incisors.  - 2 cm hiatal hernia.  - Distal Tortuous esophagus.  - No gross lesions in the entire esophagus.  - Mild healing Erosive gastropathy with no stigmata of recent bleeding. Biopsied.  - Normal duodenal bulb, first portion of the duodenum, second portion of the duodenum and third portion of the duodenum  - Decreased sphincter tone found on digital rectal exam.  - Few small polyps at the recto-sigmoid colon,two removed with a cold snare. Resected and retrieved. Clinically hyperplastic  - Diverticulosis in the sigmoid colon.  - The examined portion of the ileum was normal. Biopsied.  - Tortuous colon.  - Non-bleeding external and internal hemorrhoids.  - Biopsies were taken with a cold forceps for histology in the descending colon, in the transverse colon and in the ascending colon.  Pathology DIAGNOSIS:  A. DUODENUM, BIOPSY:  Duodenal type mucosa with no significant histopathologic abnormalities  B. ANTRUM AND BODY, BIOPSY:  Gastric antral type mucosa with mild chronic inactive gastritis  Negative for intestinal metaplasia or dysplasia  No Helicobacter pylori-like organisms seen  C. TERMINAL ILEUM  BIOPSY:  Small intestinal mucosa with no significant histopathologic abnormalities  D. RANDOM COLON BIOPSIES, ASCENDING, TRANSVERSE, AND DESCENDING:  Colonic type mucosa with no significant histopathologic abnormalities  E. RECTOSIGMOID COLON, POLYP, X2:  Hyperplastic polyps     PillCam study on 11/13/2023  Multiple areas of healed inflammation, erosion and ulceration scars throughout the small bowel noted with couple patchy areas of superficial inflammation identified in the ileum.  No deep ulceration or active inflammation otherwise.    Assessment / Plan      1. Crohn's disease of small intestine without complication  2. Diarrhea, unspecified type  3. RLQ abdominal pain  Her presenting complaint was diarrhea, RLQ abdominal pain and swelling. She had unrevealing EGD and colonoscopy 7/28/2023 with normal random colon and TI biopsies. PillCam study 11/2023 showed multiple areas of healed inflammation erosion, ulceration scars throughout the small bowel without any significant active inflammation or ulcerations.  This suggested quiescent Crohn's disease of the small intestine. Her celiac serology was negative.  Stool testing 10/5/2023 revealed borderline elevated fecal calprotectin of 122.  Fecal elastase was normal 452.  CT abdomen pelvis done on 10/5/2023 did not reveal any significant abnormality except hiatal hernia and fatty liver disease.  Patient had good symptom relief with the tapering dose of steroids and has had more improvements with the oral Pentasa as directed.  Does still continue with RLQ abdominal pain, diarrhea and bloating despite strict dietary changes. She is still smoking and taking NSAIDs regularly which may be contributing to her symptoms. Given her persistent symptoms, should consider biologics for treatment.      Labs today  Repeat stool tests  Continue Pentasa 1 g p.o. 3 times daily, refills sent  Start fiber supplement  Continue with dietary changes  Stop ibuprofen, avoid all NSAIDs  Will  consider starting anti-TNF next visit, she would like to think on this    - CBC (No Diff); Future  - Comprehensive Metabolic Panel; Future  Plus previously ordered labs/stool tests  C-reactive Protein  Calcitriol (1,25 di-OH Vitamin D)  Calprotectin, Fecal - Stool, Per Rectum  Clostridioides difficile Toxin, PCR - Stool, Per Rectum  Ferritin  Folate  Gastrointestinal Panel, PCR - Stool, Per Rectum  Hepatitis A Antibody, Total  Hepatitis B Core Antibody, Total  Hepatitis B Surface Antibody  Hepatitis B Surface Antigen  Hepatitis C Antibody  Iron Profile  QuantiFERON-TB Gold Plus  TPMT Genetics  Vitamin B12    - mesalamine (PENTASA) 500 MG CR capsule; Take 2 capsules by mouth 3 (Three) Times a Day.  Dispense: 180 capsule; Refill: 5    4. Metabolic dysfunction-associated steatotic liver disease (MASLD)  FIB4 calculation 1.74 previously. Ultrasound abd May 2023 revealed fatty liver disease without any liver lesions. She has been working on losing weight, has now lost 20 more lbs. Nonalcoholic. Liver enzymes were normal 5/2024.    Continue to work on weight loss with dietary changes  Will check CMP with labs today    Follow Up:   Return in about 6 months (around 5/14/2025) for recheck Crohn's.    Khushi Moreno PA-C  [facility] [place]  11/14/2024  16:06 EST    Dictated Utilizing Dragon Dictation: Part of this note may be an electronic transcription/translation of spoken language to printed text using the Dragon Dictation System.

## 2024-11-15 LAB
DEPRECATED RDW RBC AUTO: 43.4 FL (ref 37–54)
ERYTHROCYTE [DISTWIDTH] IN BLOOD BY AUTOMATED COUNT: 13 % (ref 12.3–15.4)
FERRITIN SERPL-MCNC: 71.2 NG/ML (ref 13–150)
HCT VFR BLD AUTO: 40.4 % (ref 34–46.6)
HGB BLD-MCNC: 13.9 G/DL (ref 12–15.9)
MCH RBC QN AUTO: 31.5 PG (ref 26.6–33)
MCHC RBC AUTO-ENTMCNC: 34.4 G/DL (ref 31.5–35.7)
MCV RBC AUTO: 91.6 FL (ref 79–97)
PLATELET # BLD AUTO: 202 10*3/MM3 (ref 140–450)
PMV BLD AUTO: 12.7 FL (ref 6–12)
RBC # BLD AUTO: 4.41 10*6/MM3 (ref 3.77–5.28)
WBC NRBC COR # BLD AUTO: 8.99 10*3/MM3 (ref 3.4–10.8)

## 2024-11-16 LAB
HAV AB SER QL IA: NEGATIVE
HBV CORE AB SERPL QL IA: NEGATIVE

## 2024-11-18 LAB — 1,25(OH)2D SERPL-MCNC: 82.4 PG/ML (ref 24.8–81.5)

## 2024-11-19 LAB
GAMMA INTERFERON BACKGROUND BLD IA-ACNC: 0 IU/ML
M TB IFN-G BLD-IMP: NEGATIVE
M TB IFN-G CD4+ BCKGRND COR BLD-ACNC: 0.01 IU/ML
M TB IFN-G CD4+CD8+ BCKGRND COR BLD-ACNC: 0.01 IU/ML
MITOGEN IGNF BCKGRD COR BLD-ACNC: >10 IU/ML
QUANTIFERON INCUBATION: NORMAL
SERVICE CMNT-IMP: NORMAL

## 2024-11-22 LAB
IMP & REVIEW OF LAB RESULTS: NORMAL
LAB DIRECTOR NAME PROVIDER: NORMAL
NUDT15 GENE MUT ANL BLD/T: NORMAL
NUDT15 GENE PROD MET ACT IMP BLD/T-IMP: NORMAL
TEST PERFORMANCE INFO SPEC: NORMAL
TPMT C.238G>C+460G>A+719A>G BLD/T: NORMAL
TPMT GENE PROD MET ACT IMP BLD/T-IMP: NORMAL

## 2025-02-04 ENCOUNTER — TRANSCRIBE ORDERS (OUTPATIENT)
Dept: ADMINISTRATIVE | Facility: HOSPITAL | Age: 65
End: 2025-02-04
Payer: MEDICAID

## 2025-02-04 DIAGNOSIS — Z12.31 ENCOUNTER FOR SCREENING MAMMOGRAM FOR BREAST CANCER: Primary | ICD-10-CM

## 2025-03-31 ENCOUNTER — TRANSCRIBE ORDERS (OUTPATIENT)
Dept: ADMINISTRATIVE | Facility: HOSPITAL | Age: 65
End: 2025-03-31
Payer: MEDICAID

## 2025-03-31 DIAGNOSIS — N64.4 MASTODYNIA: Primary | ICD-10-CM

## 2025-05-01 ENCOUNTER — OUTSIDE FACILITY SERVICE (OUTPATIENT)
Dept: CARDIOLOGY | Facility: CLINIC | Age: 65
End: 2025-05-01
Payer: MEDICAID

## 2025-05-14 ENCOUNTER — OFFICE VISIT (OUTPATIENT)
Dept: GASTROENTEROLOGY | Facility: CLINIC | Age: 65
End: 2025-05-14
Payer: MEDICAID

## 2025-05-14 ENCOUNTER — LAB (OUTPATIENT)
Dept: LAB | Facility: HOSPITAL | Age: 65
End: 2025-05-14
Payer: MEDICAID

## 2025-05-14 VITALS
BODY MASS INDEX: 31.95 KG/M2 | SYSTOLIC BLOOD PRESSURE: 122 MMHG | DIASTOLIC BLOOD PRESSURE: 70 MMHG | HEART RATE: 68 BPM | OXYGEN SATURATION: 96 % | WEIGHT: 204 LBS

## 2025-05-14 DIAGNOSIS — K50.00 CROHN'S DISEASE OF SMALL INTESTINE WITHOUT COMPLICATION: ICD-10-CM

## 2025-05-14 DIAGNOSIS — R10.31 RLQ ABDOMINAL PAIN: ICD-10-CM

## 2025-05-14 DIAGNOSIS — K76.0 METABOLIC DYSFUNCTION-ASSOCIATED STEATOTIC LIVER DISEASE (MASLD): ICD-10-CM

## 2025-05-14 DIAGNOSIS — K21.9 GASTROESOPHAGEAL REFLUX DISEASE WITHOUT ESOPHAGITIS: ICD-10-CM

## 2025-05-14 DIAGNOSIS — K50.00 CROHN'S DISEASE OF SMALL INTESTINE WITHOUT COMPLICATION: Primary | ICD-10-CM

## 2025-05-14 DIAGNOSIS — R19.7 DIARRHEA, UNSPECIFIED TYPE: ICD-10-CM

## 2025-05-14 LAB
ALBUMIN SERPL-MCNC: 4.1 G/DL (ref 3.5–5.2)
ALBUMIN/GLOB SERPL: 1.2 G/DL
ALP SERPL-CCNC: 94 U/L (ref 39–117)
ALT SERPL W P-5'-P-CCNC: 27 U/L (ref 1–33)
ANION GAP SERPL CALCULATED.3IONS-SCNC: 11.1 MMOL/L (ref 5–15)
AST SERPL-CCNC: 34 U/L (ref 1–32)
BILIRUB SERPL-MCNC: 0.4 MG/DL (ref 0–1.2)
BUN SERPL-MCNC: 9 MG/DL (ref 8–23)
BUN/CREAT SERPL: 7 (ref 7–25)
CALCIUM SPEC-SCNC: 9.8 MG/DL (ref 8.6–10.5)
CHLORIDE SERPL-SCNC: 106 MMOL/L (ref 98–107)
CO2 SERPL-SCNC: 18.9 MMOL/L (ref 22–29)
CREAT SERPL-MCNC: 1.28 MG/DL (ref 0.57–1)
DEPRECATED RDW RBC AUTO: 39.7 FL (ref 37–54)
EGFRCR SERPLBLD CKD-EPI 2021: 47.2 ML/MIN/1.73
ERYTHROCYTE [DISTWIDTH] IN BLOOD BY AUTOMATED COUNT: 11.7 % (ref 12.3–15.4)
GLOBULIN UR ELPH-MCNC: 3.4 GM/DL
GLUCOSE SERPL-MCNC: 81 MG/DL (ref 65–99)
HCT VFR BLD AUTO: 43.8 % (ref 34–46.6)
HGB BLD-MCNC: 15.2 G/DL (ref 12–15.9)
MCH RBC QN AUTO: 32.6 PG (ref 26.6–33)
MCHC RBC AUTO-ENTMCNC: 34.7 G/DL (ref 31.5–35.7)
MCV RBC AUTO: 94 FL (ref 79–97)
PLATELET # BLD AUTO: 200 10*3/MM3 (ref 140–450)
PMV BLD AUTO: 12.3 FL (ref 6–12)
POTASSIUM SERPL-SCNC: 4.7 MMOL/L (ref 3.5–5.2)
PROT SERPL-MCNC: 7.5 G/DL (ref 6–8.5)
RBC # BLD AUTO: 4.66 10*6/MM3 (ref 3.77–5.28)
SODIUM SERPL-SCNC: 136 MMOL/L (ref 136–145)
WBC NRBC COR # BLD AUTO: 10.2 10*3/MM3 (ref 3.4–10.8)

## 2025-05-14 PROCEDURE — 80053 COMPREHEN METABOLIC PANEL: CPT

## 2025-05-14 PROCEDURE — 85027 COMPLETE CBC AUTOMATED: CPT

## 2025-05-14 PROCEDURE — 1159F MED LIST DOCD IN RCRD: CPT | Performed by: PHYSICIAN ASSISTANT

## 2025-05-14 PROCEDURE — 99214 OFFICE O/P EST MOD 30 MIN: CPT | Performed by: PHYSICIAN ASSISTANT

## 2025-05-14 PROCEDURE — 1160F RVW MEDS BY RX/DR IN RCRD: CPT | Performed by: PHYSICIAN ASSISTANT

## 2025-05-14 PROCEDURE — 36415 COLL VENOUS BLD VENIPUNCTURE: CPT

## 2025-05-14 NOTE — PROGRESS NOTES
Follow Up Note     Date: 2025   Patient Name: Zulay Vega  MRN: [id number]  : 1960     Primary Care Provider: Collett, Cassaundra H, APRN     Chief Complaint   Patient presents with    Crohn's Disease     History of present illness:   2025  Zulay Vega is a 65 y.o. female who is here today for follow up regarding Crohn's Disease.    Still symptoms are same. She does not want to start any new medications. Having chest pain and undergoing testing with cardiology for this. A lot of medications have caused her cardiac symptoms in the past. She would like to stay on mesalamine that she is already on, taking 1,000 mg 3 times daily.     Tried 20 of the protonix but did not help reflux symptoms and had to go back to 40 mg once daily.     Interval History:  2023  Zulay Vega is a 63 y.o. female who is here today to establish care with Gastroenterology for evaluation for ongoing abdominal pain change in bowel habit. Patient states that she has been having issues with the significant abdominal bloating, abdominal distention, right-sided lower abdominal pain over a year now which is not improving.     Her pain is mostly on the right side abdomen and also sometimes suprapubic region sometimes pain radiates to her back.  Pain intermittent present most days.  She feels bloated mostly on the right side abdomen.  She has been having bowel movement 2 to 3/day which very different in shape and sometimes floats.  Denies any blood in the stool.  No constipation as such.  Occasional nausea associated but no vomiting.  She is a chronic smoker and her dietary habit is also not healthy and drinks multiple cans of carbonated beverages at least 4 to 5 20 once beverages /day.  As per patient she also gained over 100 pounds since last 1 to 2 years.     She also has a history of gastroesophageal reflux disease and is well controlled with PPI daily that has been started by PCP in 2023.   She states that she had a blood work done which was positive for H. pylori for which he was given a triple therapy.  To note that H. pylori breath test was negative before the antibiotic therapy. No prior EGD or colonoscopy.  She is nonalcoholic.  No history of anemia.  No family history of colon cancer or GI malignancy.  Her lab work done in March 2023 revealed a normal CBC with a hemoglobin of 14 and normal CMP.  Celiac serology was negative.    Subjective     Past Medical History:   Diagnosis Date    Angina pectoris 06/29/2021    Anxiety and depression     Arthritis     Asystole 1993    coded during a cardiac ablation    AV yesenia tachycardia     with svt    Blurred vision     Chest pain 11/2022    was seen at [facility] that her heart checked out ok.    Dizziness     GERD (gastroesophageal reflux disease)     Heart disease     Hypotension     orthostatic    Hypothyroidism     Migraines     Mitral valve disease 06/29/2021    MRSA (methicillin resistant staph aureus) culture positive 2016    ARMS    Osteoporosis 06/29/2021    Pain in joint involving multiple sites     Poor historian 06/29/2021    POTS (postural orthostatic tachycardia syndrome)     Seizures 2016    states were stress induced and then went away    Snores     Wears dentures     upper plate only     Past Surgical History:   Procedure Laterality Date    CARDIAC ABLATION      1993    COLONOSCOPY N/A 07/28/2023    Procedure: COLONOSCOPY WITH BIOPSY AND POLYPECTOMY;  Surgeon: Preston Amos MD;  Location: [facility] ENDOSCOPY;  Service: Gastroenterology;  Laterality: N/A;    ENDOSCOPY N/A 07/28/2023    Procedure: ESOPHAGOGASTRODUODENOSCOPY WITH BIOPSY;  Surgeon: Preston Amos MD;  Location: [facility] ENDOSCOPY;  Service: Gastroenterology;  Laterality: N/A;    HIP CANNULATED SCREW PLACEMENT Right 07/01/2021    Procedure: CANNULATED SCREWS FIXATION OF RIGHT HIP FRACTURE;  Surgeon: Ady Gomez MD;  Location: [facility] OR;  Service: Orthopedics;   Laterality: Right;    MOUTH SURGERY N/A     teeth extraction, ready for implants    TONSILLECTOMY Bilateral     VAGINAL DELIVERY N/A     VAGINAL HYSTERECTOMY SALPINGO OOPHORECTOMY Bilateral     WISDOM TOOTH EXTRACTION       Family History   Problem Relation Age of Onset    Cancer Mother     Throat cancer Mother     Diabetes Father     Heart disease Father     Suicidality Brother     Cancer Paternal Aunt     Pancreatic cancer Paternal Aunt     Leukemia Son     Hypertension Sister     Cancer Maternal Aunt     Breast cancer Maternal Aunt     Colon cancer Neg Hx     Cirrhosis Neg Hx     Liver cancer Neg Hx     Liver disease Neg Hx     Stomach cancer Neg Hx     Esophageal cancer Neg Hx      Social History     Socioeconomic History    Marital status:    Tobacco Use    Smoking status: Every Day     Current packs/day: 0.50     Average packs/day: 0.5 packs/day for 49.4 years (24.7 ttl pk-yrs)     Types: Cigarettes     Start date: 1976    Smokeless tobacco: Never   Vaping Use    Vaping status: Former    Substances: Nicotine, Flavoring    Devices: Disposable   Substance and Sexual Activity    Alcohol use: Never    Drug use: Never    Sexual activity: Defer     Current Outpatient Medications:     atorvastatin (LIPITOR) 40 MG tablet, Take 1 tablet by mouth Daily., Disp: , Rfl:     estradiol (CLIMARA) 0.075 MG/24HR patch, APPLY 1 PATCH TOPICALLY TO THE SKIN 2 TIMES WEEKLY, Disp: , Rfl:     Famotidine (PEPCID PO), Take  by mouth Daily As Needed., Disp: , Rfl:     mesalamine (PENTASA) 500 MG CR capsule, Take 2 capsules by mouth 3 (Three) Times a Day., Disp: 180 capsule, Rfl: 5    metaxalone (SKELAXIN) 800 MG tablet, Take 1 tablet by mouth 3 (Three) Times a Day., Disp: , Rfl:     midodrine (PROAMATINE) 5 MG tablet, Take 1 tablet by mouth 3 (Three) Times a Day., Disp: 90 tablet, Rfl: 0    pantoprazole (PROTONIX) 40 MG EC tablet, Take 1 tablet by mouth Daily., Disp: 90 tablet, Rfl: 1    pindolol (VISKEN) 5 MG tablet, Take 1  tablet by mouth 2 (Two) Times a Day., Disp: 60 tablet, Rfl: 0    pregabalin (LYRICA) 150 MG capsule, Take 1 capsule by mouth 3 (Three) Times a Day., Disp: , Rfl:     traMADol (ULTRAM) 50 MG tablet, Take 1 tablet by mouth 3 (Three) Times a Day., Disp: , Rfl:     Allergies   Allergen Reactions    Isuprel [Isoproterenol] Anaphylaxis     States that this caused her to code during a cardiac ablation.      Nsaids Rash     Patient states she can tolerate now without issue.     Wellbutrin [Bupropion] Dizziness     The following portions of the patient's history were reviewed and updated as appropriate: allergies, current medications, past family history, past medical history, past social history, past surgical history and problem list.    Objective     Physical Exam  Constitutional:       General: She is not in acute distress.     Appearance: Normal appearance. She is well-developed. She is not diaphoretic.   HENT:      Head: Normocephalic and atraumatic.      Right Ear: External ear normal.      Left Ear: External ear normal.      Nose: Nose normal.   Eyes:      General: No scleral icterus.        Right eye: No discharge.         Left eye: No discharge.      Conjunctiva/sclera: Conjunctivae normal.   Neck:      Trachea: No tracheal deviation.   Pulmonary:      Effort: Pulmonary effort is normal. No respiratory distress.   Musculoskeletal:         General: Normal range of motion.      Cervical back: Normal range of motion.   Skin:     Coloration: Skin is not pale.      Findings: No erythema or rash.   Neurological:      Mental Status: She is alert and oriented to person, place, and time.      Coordination: Coordination normal.   Psychiatric:         Mood and Affect: Mood normal.         Behavior: Behavior normal.         Thought Content: Thought content normal.         Judgment: Judgment normal.       Vitals:    05/14/25 1506   BP: 122/70   Pulse: 68   SpO2: 96%   Weight: 92.5 kg (204 lb)     Results Review:   I reviewed  the patient's new clinical results.    Lab on 11/14/2024   Component Date Value Ref Range Status    Glucose 11/14/2024 89  65 - 99 mg/dL Final    BUN 11/14/2024 11  8 - 23 mg/dL Final    Creatinine 11/14/2024 1.27 (H)  0.57 - 1.00 mg/dL Final    Sodium 11/14/2024 139  136 - 145 mmol/L Final    Potassium 11/14/2024 4.4  3.5 - 5.2 mmol/L Final    Chloride 11/14/2024 108 (H)  98 - 107 mmol/L Final    CO2 11/14/2024 19.9 (L)  22.0 - 29.0 mmol/L Final    Calcium 11/14/2024 10.1  8.6 - 10.5 mg/dL Final    Total Protein 11/14/2024 7.3  6.0 - 8.5 g/dL Final    Albumin 11/14/2024 4.1  3.5 - 5.2 g/dL Final    ALT (SGPT) 11/14/2024 29  1 - 33 U/L Final    AST (SGOT) 11/14/2024 34 (H)  1 - 32 U/L Final    Alkaline Phosphatase 11/14/2024 83  39 - 117 U/L Final    Total Bilirubin 11/14/2024 0.3  0.0 - 1.2 mg/dL Final    Globulin 11/14/2024 3.2  gm/dL Final    A/G Ratio 11/14/2024 1.3  g/dL Final    BUN/Creatinine Ratio 11/14/2024 8.7  7.0 - 25.0 Final    Anion Gap 11/14/2024 11.1  5.0 - 15.0 mmol/L Final    eGFR 11/14/2024 47.9 (L)  >60.0 mL/min/1.73 Final    C-Reactive Protein 11/14/2024 0.39  0.00 - 0.50 mg/dL Final    Ferritin 11/14/2024 71.20  13.00 - 150.00 ng/mL Final    Folate 11/14/2024 12.90  4.78 - 24.20 ng/mL Final    1,25-Dihydroxy, Vitamin D 11/14/2024 82.4 (H)  24.8 - 81.5 pg/mL Final    Vitamin B-12 11/14/2024 685  211 - 946 pg/mL Final    QuantiFERON Incubation 11/14/2024 Incubation performed.   Final    QUANTIFERON-TB GOLD PLUS 11/14/2024 Negative  Negative Final    No response to M tuberculosis antigens detected.  Infection with M tuberculosis is unlikely, but high risk  individuals should be considered for additional testing  (ATS/IDSA/CDC Clinical Practice Guidelines, 2017). The  reference range is an Antigen minus Nil result of <0.35 IU/mL.  Chemiluminescence immunoassay methodology    Iron 11/14/2024 109  37 - 145 mcg/dL Final    Iron Saturation (TSAT) 11/14/2024 31  20 - 50 % Final    Transferrin 11/14/2024  237  200 - 360 mg/dL Final    TIBC 11/14/2024 353  298 - 536 mcg/dL Final    Hep B Core Total Ab 11/14/2024 Negative  Negative Final    Hep B S Ab 11/14/2024 Non-Reactive   Final    Hepatitis B Surface Ag 11/14/2024 Non-Reactive  Non-Reactive Final    Hep A Total Ab 11/14/2024 Negative  Negative Final    Comment: The HAV total antibody assay detects both IgG and IgM  but does not differentiate between them. A negative result  suggests susceptibility to infection. A positive result could  be due to vaccination, previously resolved infection or active  infection. Testing for HAV IgM should be performed if active  HAV infection is suspected. [employer] offers profiles that will  automatically reflex positive HAV total antibody results to  IgM (e.g., panel #[id number] HAV Antibody w/ Rfx).    Hepatitis C Ab 11/14/2024 Non-Reactive  Non-Reactive Final    TPMT Genotype 11/14/2024 *1/*1   Final    TPMT Metabolic Activity 11/14/2024 Normal   Final    NUDT15 GENOTYPE 11/14/2024 *1/*1   Final    NUDT15 Metabolic Activity 11/14/2024 Normal   Final    WBC 11/14/2024 8.99  3.40 - 10.80 10*3/mm3 Final    RBC 11/14/2024 4.41  3.77 - 5.28 10*6/mm3 Final    Hemoglobin 11/14/2024 13.9  12.0 - 15.9 g/dL Final    Hematocrit 11/14/2024 40.4  34.0 - 46.6 % Final    MCV 11/14/2024 91.6  79.0 - 97.0 fL Final    MCH 11/14/2024 31.5  26.6 - 33.0 pg Final    MCHC 11/14/2024 34.4  31.5 - 35.7 g/dL Final    RDW 11/14/2024 13.0  12.3 - 15.4 % Final    RDW-SD 11/14/2024 43.4  37.0 - 54.0 fl Final    MPV 11/14/2024 12.7 (H)  6.0 - 12.0 fL Final    Platelets 11/14/2024 202  140 - 450 10*3/mm3 Final    QuantiFERON Criteria 11/14/2024 Comment   Final    QuantiFERON-TB Gold Plus is a qualitative indirect test for  M tuberculosis infection (including disease) and is intended for use  in conjunction with risk assessment, radiography, and other medical  and diagnostic evaluations. The QuantiFERON-TB Gold Plus result is  determined by subtracting the Nil value  from either TB antigen (Ag)  value. The Mitogen tube serves as a control for the test.    QUANTIFERON TB1 AG VALUE 11/14/2024 0.01  IU/mL Final    QUANTIFERON TB2 AG VALUE 11/14/2024 0.01  IU/mL Final    QuantiFERON Nil Value 11/14/2024 0.00  IU/mL Final    QuantiFERON Mitogen Value 11/14/2024 >10.00  IU/mL Final      EGD and colonoscopy 7/28/23  - Normal oropharynx.  - Z-line regular, 33 cm from the incisors.  - 2 cm hiatal hernia.  - Distal Tortuous esophagus.  - No gross lesions in the entire esophagus.  - Mild healing Erosive gastropathy with no stigmata of recent bleeding. Biopsied.  - Normal duodenal bulb, first portion of the duodenum, second portion of the duodenum and third portion of the duodenum  - Decreased sphincter tone found on digital rectal exam.  - Few small polyps at the recto-sigmoid colon,two removed with a cold snare. Resected and retrieved. Clinically hyperplastic  - Diverticulosis in the sigmoid colon.  - The examined portion of the ileum was normal. Biopsied.  - Tortuous colon.  - Non-bleeding external and internal hemorrhoids.  - Biopsies were taken with a cold forceps for histology in the descending colon, in the transverse colon and in the ascending colon.  Pathology DIAGNOSIS:  A. DUODENUM, BIOPSY:  Duodenal type mucosa with no significant histopathologic abnormalities  B. ANTRUM AND BODY, BIOPSY:  Gastric antral type mucosa with mild chronic inactive gastritis  Negative for intestinal metaplasia or dysplasia  No Helicobacter pylori-like organisms seen  C. TERMINAL ILEUM BIOPSY:  Small intestinal mucosa with no significant histopathologic abnormalities  D. RANDOM COLON BIOPSIES, ASCENDING, TRANSVERSE, AND DESCENDING:  Colonic type mucosa with no significant histopathologic abnormalities  E. RECTOSIGMOID COLON, POLYP, X2:  Hyperplastic polyps     PillCam study on 11/13/2023  Multiple areas of healed inflammation, erosion and ulceration scars throughout the small bowel noted with couple  patchy areas of superficial inflammation identified in the ileum.  No deep ulceration or active inflammation otherwise    Assessment / Plan      1. Crohn's disease of small intestine without complication  2. Diarrhea, unspecified type  3. RLQ abdominal pain  4. Gastroesophageal reflux disease without esophagitis  Her presenting complaint was diarrhea, RLQ abdominal pain and swelling. She had unrevealing EGD and colonoscopy 7/28/2023 with normal random colon and TI biopsies. PillCam study 11/2023 showed multiple areas of healed inflammation erosion, ulceration scars throughout the small bowel without any significant active inflammation or ulcerations. This suggested quiescent Crohn's disease of the small intestine. Her celiac serology was negative.  Stool testing 10/5/2023 revealed borderline elevated fecal calprotectin of 122.  Fecal elastase was normal 452. Labs 11/2024 with normal iron, normal B12, normal folate, vit d elevated. CT abdomen pelvis done on 10/5/2023 did not reveal any significant abnormality except hiatal hernia and fatty liver disease. Patient had good symptom relief with the tapering dose of steroids (only took briefly) and has had more improvements with the oral Pentasa as directed.  Does still continue with RLQ abdominal pain, diarrhea and bloating despite strict dietary changes. She is still smoking but no longer taking NSAIDs regularly. GERD symptoms currently controlled with protonix 40 mg once daily. Given her persistent symptoms, recommended further treatment of her Crohn's disease.      Repeat stool tests  Continue Pentasa 1 g p.o. 3 times daily  Continue fiber supplement  Continue with dietary changes  Avoid all NSAIDs  Declined starting any anti-TNF treatment as recommended  Continue PPI daily  Acid reflux measures  Stop smoking    - Comprehensive Metabolic Panel; Future  - CBC (No Diff); Future    - Calprotectin, Fecal - Stool, Per Rectum; Future  - Gastrointestinal Panel, PCR - Stool,  Per Rectum; Future  - Clostridioides difficile Toxin, PCR - Stool, Per Rectum; Future    5. Metabolic dysfunction-associated steatotic liver disease (MASLD)  FIB4 calculation 1.74 previously. Ultrasound abd May 2023 revealed fatty liver disease without any liver lesions. She has been working on losing weight, has now lost 20 more lbs. Nonalcoholic. Liver enzymes were normal 5/2024, mildly elevated ALT on last labs 11/2024.     Continue to work on weight loss with dietary changes  Re-check liver enzymes today    Follow Up:   Return in about 6 months (around 11/14/2025) for recheck Crohn's.    Khushi Moreno PA-C  [facility] [place]  5/14/2025  16:44 EDT

## 2025-05-15 ENCOUNTER — RESULTS FOLLOW-UP (OUTPATIENT)
Dept: GASTROENTEROLOGY | Facility: CLINIC | Age: 65
End: 2025-05-15
Payer: MEDICAID

## 2025-05-15 DIAGNOSIS — R79.89 ELEVATED SERUM CREATININE: Primary | ICD-10-CM

## 2025-05-19 ENCOUNTER — TELEPHONE (OUTPATIENT)
Dept: GASTROENTEROLOGY | Facility: CLINIC | Age: 65
End: 2025-05-19
Payer: MEDICAID

## 2025-05-19 NOTE — TELEPHONE ENCOUNTER
Caller: Zulay Vega    Relationship to patient: Self    Best call back number: [phone]     Patient is needing: PATIENT SENT OVER  PATIENT MSG IN [facility] BUT WANTED TO MAKE SURE THE PRACTICE RECEIVED IT.  SHE IS REQUESTING A REFERRAL TO NEPHROLOGY AND STATES SHE WOULD LIKE TO GET IN WITH A NEPHROLOGIST AS SOON AS POSSIBLE.  PLEASE CALL BACK TO [facility].

## 2025-08-11 ENCOUNTER — TRANSCRIBE ORDERS (OUTPATIENT)
Dept: ADMINISTRATIVE | Facility: HOSPITAL | Age: 65
End: 2025-08-11
Payer: MEDICAID

## 2025-08-11 DIAGNOSIS — N18.31 CHRONIC KIDNEY DISEASE (CKD) STAGE G3A/A1, MODERATELY DECREASED GLOMERULAR FILTRATION RATE (GFR) BETWEEN 45-59 ML/MIN/1.73 SQUARE METER AND ALBUMINURIA CREATININE RATIO LESS THAN 30 MG/G (CMS/H*: Primary | ICD-10-CM

## (undated) DEVICE — SUT VIC 2/0 CT1 27IN J259H

## (undated) DEVICE — ENDOSCOPY PORT CONNECTOR FOR OLYMPUS® SCOPES: Brand: ERBE

## (undated) DEVICE — CONMED SCOPE SAVER BITE BLOCK, 20X27 MM: Brand: SCOPE SAVER

## (undated) DEVICE — FRCP BX RADJAW4 NDL 2.8 240 STD OG

## (undated) DEVICE — Device

## (undated) DEVICE — PROXIMATE SKIN STAPLERS (35 WIDE) CONTAINS 35 STAINLESS STEEL STAPLES (FIXED HEAD): Brand: PROXIMATE

## (undated) DEVICE — RICH MAJOR PROCEDURE: Brand: MEDLINE INDUSTRIES, INC.

## (undated) DEVICE — PENCL ES MEGADINE EZ/CLEAN BUTN W/HOLSTR 10FT

## (undated) DEVICE — PATIENT RETURN ELECTRODE, SINGLE-USE, CONTACT QUALITY MONITORING, ADULT, WITH 9FT CORD, FOR PATIENTS WEIGING OVER 33LBS. (15KG): Brand: MEGADYNE

## (undated) DEVICE — GW THRD TROC/PT 2.8X300MM

## (undated) DEVICE — GLV SURG SENSICARE W/ALOE PF LF 8 STRL

## (undated) DEVICE — GLV SURG SENSICARE W/ALOE PF LF 7.5 STRL

## (undated) DEVICE — 6619 2 PTNT ISO SYS INCISE AREA&LT;(&GT;&&LT;)&GT;P: Brand: STERI-DRAPE™ IOBAN™ 2

## (undated) DEVICE — VLV SXN AIR/H2O ORCAPOD3 1P/U STRL

## (undated) DEVICE — SINGLE-USE POLYPECTOMY SNARE: Brand: CAPTIVATOR II

## (undated) DEVICE — DRSNG GZ PETROLTM XEROFORM CURAD 1X8IN STRL

## (undated) DEVICE — 3M™ STERI-DRAPE™ U-DRAPE 1015: Brand: STERI-DRAPE™

## (undated) DEVICE — CLAVICLE STRAP: Brand: DEROYAL

## (undated) DEVICE — GOWN,SIRUS,NON REINFRCD XL XLONG: Brand: MEDLINE

## (undated) DEVICE — LUBE JELLY PK/2.75GM STRL BX/144

## (undated) DEVICE — QUICK CATCH IN-LINE SUCTION POLYP TRAP IS USED FOR SUCTION RETRIEVAL OF ENDOSCOPICALLY REMOVED POLYPS.

## (undated) DEVICE — HYBRID TUBING/CAP SET FOR OLYMPUS® SCOPES: Brand: ERBE